# Patient Record
Sex: FEMALE | Race: WHITE | NOT HISPANIC OR LATINO | Employment: OTHER | URBAN - METROPOLITAN AREA
[De-identification: names, ages, dates, MRNs, and addresses within clinical notes are randomized per-mention and may not be internally consistent; named-entity substitution may affect disease eponyms.]

---

## 2018-02-28 ENCOUNTER — CONVERSION ENCOUNTER (OUTPATIENT)
Dept: GENERAL RADIOLOGY | Facility: HOSPITAL | Age: 78
End: 2018-02-28

## 2018-03-05 ENCOUNTER — OFFICE VISIT CONVERTED (OUTPATIENT)
Dept: PODIATRY | Facility: CLINIC | Age: 78
End: 2018-03-05
Attending: PODIATRIST

## 2020-02-19 ENCOUNTER — HOSPITAL ENCOUNTER (OUTPATIENT)
Dept: OTHER | Facility: HOSPITAL | Age: 80
Discharge: HOME | End: 2020-02-19
Attending: INTERNAL MEDICINE

## 2020-03-05 ENCOUNTER — HOSPITAL ENCOUNTER (OUTPATIENT)
Dept: OTHER | Facility: HOSPITAL | Age: 80
Discharge: HOME | End: 2020-03-05
Attending: INTERNAL MEDICINE

## 2020-03-05 LAB
ALBUMIN SERPL-MCNC: 3.8 G/DL (ref 3.5–5)
ALBUMIN/GLOB SERPL: 1.2 {RATIO} (ref 1.4–2.6)
ALP SERPL-CCNC: 82 U/L (ref 43–160)
ALT SERPL-CCNC: 20 U/L (ref 10–40)
ANION GAP SERPL CALC-SCNC: 17 MMOL/L (ref 8–19)
AST SERPL-CCNC: 14 U/L (ref 15–50)
BASOPHILS # BLD AUTO: 0.03 10*3/UL (ref 0–0.2)
BASOPHILS NFR BLD AUTO: 0.2 % (ref 0–3)
BILIRUB SERPL-MCNC: 0.17 MG/DL (ref 0.2–1.3)
BUN SERPL-MCNC: 12 MG/DL (ref 5–25)
BUN/CREAT SERPL: 19 {RATIO} (ref 6–20)
CALCIUM SERPL-MCNC: 9.5 MG/DL (ref 8.7–10.4)
CHLORIDE SERPL-SCNC: 101 MMOL/L (ref 99–111)
CONV ABS IMM GRAN: 0.08 10*3/UL (ref 0–0.2)
CONV CO2: 27 MMOL/L (ref 22–32)
CONV IMMATURE GRAN: 0.6 % (ref 0–1.8)
CONV TOTAL PROTEIN: 7 G/DL (ref 6.3–8.2)
CREAT UR-MCNC: 0.63 MG/DL (ref 0.5–0.9)
CRP SERPL HS-MCNC: 1.89 MG/DL (ref 0–0.5)
DEPRECATED RDW RBC AUTO: 46.4 FL (ref 36.4–46.3)
EOSINOPHIL # BLD AUTO: 0.01 10*3/UL (ref 0–0.7)
EOSINOPHIL # BLD AUTO: 0.1 % (ref 0–7)
ERYTHROCYTE [DISTWIDTH] IN BLOOD BY AUTOMATED COUNT: 13.3 % (ref 11.7–14.4)
ERYTHROCYTE [SEDIMENTATION RATE] IN BLOOD: 48 MM/H (ref 0–30)
GFR SERPLBLD BASED ON 1.73 SQ M-ARVRAT: >60 ML/MIN/{1.73_M2}
GLOBULIN UR ELPH-MCNC: 3.2 G/DL (ref 2–3.5)
GLUCOSE SERPL-MCNC: 125 MG/DL (ref 65–99)
HCT VFR BLD AUTO: 39.1 % (ref 37–47)
HGB BLD-MCNC: 11.8 G/DL (ref 12–16)
LYMPHOCYTES # BLD AUTO: 0.95 10*3/UL (ref 1–5)
LYMPHOCYTES NFR BLD AUTO: 7.3 % (ref 20–45)
MCH RBC QN AUTO: 28.9 PG (ref 27–31)
MCHC RBC AUTO-ENTMCNC: 30.2 G/DL (ref 33–37)
MCV RBC AUTO: 95.8 FL (ref 81–99)
MONOCYTES # BLD AUTO: 0.65 10*3/UL (ref 0.2–1.2)
MONOCYTES NFR BLD AUTO: 5 % (ref 3–10)
NEUTROPHILS # BLD AUTO: 11.38 10*3/UL (ref 2–8)
NEUTROPHILS NFR BLD AUTO: 86.8 % (ref 30–85)
NRBC CBCN: 0 % (ref 0–0.7)
OSMOLALITY SERPL CALC.SUM OF ELEC: 293 MOSM/KG (ref 273–304)
PLATELET # BLD AUTO: 335 10*3/UL (ref 130–400)
PMV BLD AUTO: 9.6 FL (ref 9.4–12.3)
POTASSIUM SERPL-SCNC: 3.7 MMOL/L (ref 3.5–5.3)
RBC # BLD AUTO: 4.08 10*6/UL (ref 4.2–5.4)
SODIUM SERPL-SCNC: 141 MMOL/L (ref 135–147)
WBC # BLD AUTO: 13.1 10*3/UL (ref 4.8–10.8)

## 2020-03-06 LAB — B BURGDOR IGG+IGM SER-ACNC: <0.91 ISR (ref 0–0.9)

## 2020-03-07 LAB
ASO AB SERPL-ACNC: 55 [IU]/ML (ref 0–200)
CCP IGA+IGG SERPL IA-ACNC: 9 UNITS (ref 0–19)
CONV RHEUMATOID FACTOR IGM: <10 [IU]/ML (ref 0–14)
CRP SERPL-MCNC: 20.8 MG/L (ref 0–5)
DSDNA AB SER-ACNC: NEGATIVE [IU]/ML
ENA AB SER IA-ACNC: NEGATIVE {RATIO}
URATE SERPL-MCNC: 4.1 MG/DL (ref 2.5–7.5)

## 2020-09-18 ENCOUNTER — HOSPITAL ENCOUNTER (OUTPATIENT)
Dept: OTHER | Facility: HOSPITAL | Age: 80
Discharge: HOME | End: 2020-09-18
Attending: INTERNAL MEDICINE

## 2021-01-13 ENCOUNTER — HOSPITAL ENCOUNTER (OUTPATIENT)
Dept: OTHER | Facility: HOSPITAL | Age: 81
Discharge: HOME | End: 2021-01-13
Attending: INTERNAL MEDICINE

## 2021-02-08 ENCOUNTER — HOSPITAL ENCOUNTER (OUTPATIENT)
Dept: VACCINE CLINIC | Facility: HOSPITAL | Age: 81
Discharge: HOME | End: 2021-02-08
Attending: INTERNAL MEDICINE

## 2021-04-06 ENCOUNTER — HOSPITAL ENCOUNTER (OUTPATIENT)
Dept: OTHER | Facility: HOSPITAL | Age: 81
Discharge: HOME | End: 2021-04-06
Attending: INTERNAL MEDICINE

## 2021-04-06 LAB
ALBUMIN SERPL-MCNC: 3.6 G/DL (ref 3.5–5)
ALBUMIN/GLOB SERPL: 1.1 {RATIO} (ref 1.4–2.6)
ALP SERPL-CCNC: 74 U/L (ref 43–160)
ALT SERPL-CCNC: 12 U/L (ref 10–40)
ANION GAP SERPL CALC-SCNC: 16 MMOL/L (ref 8–19)
AST SERPL-CCNC: 14 U/L (ref 15–50)
BASOPHILS # BLD AUTO: 0.04 10*3/UL (ref 0–0.2)
BASOPHILS NFR BLD AUTO: 0.6 % (ref 0–3)
BILIRUB SERPL-MCNC: 0.22 MG/DL (ref 0.2–1.3)
BUN SERPL-MCNC: 12 MG/DL (ref 5–25)
BUN/CREAT SERPL: 18 {RATIO} (ref 6–20)
CALCIUM SERPL-MCNC: 9.4 MG/DL (ref 8.7–10.4)
CHLORIDE SERPL-SCNC: 103 MMOL/L (ref 99–111)
CONV ABS IMM GRAN: 0.03 10*3/UL (ref 0–0.2)
CONV CO2: 24 MMOL/L (ref 22–32)
CONV IMMATURE GRAN: 0.4 % (ref 0–1.8)
CONV RHEUMATOID FACTOR IGM: <10 [IU]/ML (ref 0–14)
CONV TOTAL PROTEIN: 6.8 G/DL (ref 6.3–8.2)
CREAT UR-MCNC: 0.68 MG/DL (ref 0.5–0.9)
CRP SERPL-MCNC: 51.6 MG/L (ref 0–5)
DEPRECATED RDW RBC AUTO: 41.9 FL (ref 36.4–46.3)
EOSINOPHIL # BLD AUTO: 0.09 10*3/UL (ref 0–0.7)
EOSINOPHIL # BLD AUTO: 1.3 % (ref 0–7)
ERYTHROCYTE [DISTWIDTH] IN BLOOD BY AUTOMATED COUNT: 11.9 % (ref 11.7–14.4)
ERYTHROCYTE [SEDIMENTATION RATE] IN BLOOD: 62 MM/H (ref 0–30)
GFR SERPLBLD BASED ON 1.73 SQ M-ARVRAT: >60 ML/MIN/{1.73_M2}
GLOBULIN UR ELPH-MCNC: 3.2 G/DL (ref 2–3.5)
GLUCOSE SERPL-MCNC: 109 MG/DL (ref 65–99)
HCT VFR BLD AUTO: 36.4 % (ref 37–47)
HGB BLD-MCNC: 11.7 G/DL (ref 12–16)
LYMPHOCYTES # BLD AUTO: 1.28 10*3/UL (ref 1–5)
LYMPHOCYTES NFR BLD AUTO: 18.1 % (ref 20–45)
MCH RBC QN AUTO: 30.5 PG (ref 27–31)
MCHC RBC AUTO-ENTMCNC: 32.1 G/DL (ref 33–37)
MCV RBC AUTO: 94.8 FL (ref 81–99)
MONOCYTES # BLD AUTO: 0.74 10*3/UL (ref 0.2–1.2)
MONOCYTES NFR BLD AUTO: 10.5 % (ref 3–10)
NEUTROPHILS # BLD AUTO: 4.9 10*3/UL (ref 2–8)
NEUTROPHILS NFR BLD AUTO: 69.1 % (ref 30–85)
NRBC CBCN: 0 % (ref 0–0.7)
OSMOLALITY SERPL CALC.SUM OF ELEC: 288 MOSM/KG (ref 273–304)
PLATELET # BLD AUTO: 351 10*3/UL (ref 130–400)
PMV BLD AUTO: 10.2 FL (ref 9.4–12.3)
POTASSIUM SERPL-SCNC: 3.9 MMOL/L (ref 3.5–5.3)
RBC # BLD AUTO: 3.84 10*6/UL (ref 4.2–5.4)
SODIUM SERPL-SCNC: 139 MMOL/L (ref 135–147)
WBC # BLD AUTO: 7.08 10*3/UL (ref 4.8–10.8)

## 2021-04-07 LAB
CH50 SERPL-ACNC: >60 U/ML
DSDNA AB SER-ACNC: <1 IU/ML (ref 0–9)
DSDNA AB SER-ACNC: NEGATIVE [IU]/ML
ENA AB SER IA-ACNC: NEGATIVE {RATIO}
ENA RNP AB SER-ACNC: <0.2 AI (ref 0–0.9)
SMOOTH MUSCLE F-ACTIN AB IGG: 4 UNITS (ref 0–19)

## 2021-04-08 LAB
CHROMATIN AB: <0.2 AI (ref 0–0.9)
ENA SS-B AB SER-ACNC: <0.2 AI (ref 0–0.9)
SJOGREN'S ANTI-SS-A: <0.2 AI (ref 0–0.9)

## 2021-04-09 LAB
C3 SERPL-MCNC: 163 MG/DL (ref 88–201)
C4 SERPL-MCNC: 31 MG/DL (ref 10–40)
CCP IGA+IGG SERPL IA-ACNC: 3 UNITS (ref 0–19)
CONV ANA IGG BY ELISA: NORMAL
RHEUMATOID FACT SERPL-ACNC: 11 IU/ML (ref 0–14)

## 2021-04-16 ENCOUNTER — OFFICE VISIT CONVERTED (OUTPATIENT)
Dept: ORTHOPEDIC SURGERY | Facility: CLINIC | Age: 81
End: 2021-04-16
Attending: ORTHOPAEDIC SURGERY

## 2021-04-16 ENCOUNTER — CONVERSION ENCOUNTER (OUTPATIENT)
Dept: ORTHOPEDIC SURGERY | Facility: CLINIC | Age: 81
End: 2021-04-16

## 2021-04-20 LAB — CHROMATIN AB: <0.2

## 2021-05-09 NOTE — H&P
History and Physical      Patient Name: Patricia Donahue   Patient ID: [id number]   Sex: Female   YOB: 1940    Primary Care Provider: Ladarius Crockett MD   Referring Provider: Ministerio Wells DPM    Visit Date: March 5, 2018    Provider: Ministerio Wells DPM   Location: [facility]   Location Address: [address] [place] [address]  [id number]   Location Phone: [phone]          Chief Complaint  · Right Foot Pain  · Left Foot Pain  · Fungal Toenails      History Of Present Illness  Patricia Donahue presents to the office today for evaluation and treatment of      New, Established, New Problem:  New  Location:  Toenails  Duration:  Greater than five years  Onset:  Gradual  Nature:  sore with palpation.  Stable, worsening, improving:   Worsening  Aggravating factors:  Pain with shoe gear and ambulation.  Previous Treatment:  Use OTC tea tree oil       Past Medical History  High blood pressure; High cholesterol         Past Surgical History  *Denies any surgical procedures; *No Past Surgical History         Medication List  docusate sodium 100 mg oral capsule; esomeprazole magnesium 40 mg oral capsule,delayed release(DR/EC); nifedipine 90 mg oral tablet extended release; ranitidine HCl 300 mg oral capsule; Synthroid 50 mcg oral tablet; Yuvafem 10 mcg vaginal tablet         Allergy List  NO KNOWN DRUG ALLERGIES         Family Medical History  *No Known Family History         Social History  Alcohol (Never); Tobacco (Never)         Review of Systems  · Constitutional  o Admits  o : good general health lately  o Denies  o : fever, chills, additional constitutional symptoms except as noted in the HPI  · Eyes  o Denies  o : double vision  · HENT  o Denies  o : vertigo, recent head injury  · Cardiovascular  o Denies  o : chest pain, irregular heart beats  · Respiratory  o Denies  o : shortness of breath  · Gastrointestinal  o Denies  o : nausea,  "vomiting  · Integument  o Denies  o : new skin lesions  · Neurologic  o Denies  o : altered mental status, tingling or numbness  · Musculoskeletal  o Denies  o : joint pain, joint swelling, limitation of motion      Vitals  Date Time BP Position Site L\R Cuff Size HR RR TEMP(F) WT  HT  BMI kg/m2 BSA m2 O2 Sat HC       03/05/2018 02:53 PM      78 - R   136lbs 0oz 5'  4\" 23.34 1.67 100 %           Physical Examination  · Constitutional  o Appearance  o : No acute distress and appears to be generally in good health  · Cardiovascular  o Peripheral Vascular System  o :   § Pedal Pulses  § : Pulses 2 + and symmetrical  § Extremities  § : No edema in lower extremities  · Musculoskeletal  o General  o :   § General Musculoskeletal  § : Lower extremity muscle and strength and range of motion is equal and symmetrical bilaterally. The knees are noted to be normal in alignment. Ankle alignment and range of motion is notmral and foot structure is normal. Subtalar, metatarsal and metatarsal-phalangeal range of motion is noted to be within normal limits. The digits of both feet are in normal alignment. The gait is normal.  · Skin and Subcutaneous Tissue  o General Inspection  o : Skin is noted to have normal texture and turgot, with no excrescences noted. The toenails are noted to be without disese.   o Digits and Nails  o : Toenails, Right 1, 2, 3, 4, 5 and Left 1, 2, 3, 4, 5, are incurvated, elongated, thickened, yellowed, chalky, and painful to palpation.   · Neurologic  o Mental Status Examination  o :   § Orientation  § : Awake, alert, understands questions and responds appropriately and quickly  o Sensation  o :   § Light Touch  § : Epicritic sensations intact bilaterally.          Assessment  · Foot pain, left     729.5/M79.672  · Foot pain, right     729.5/M79.671  · Onychomycosis     110.1/B35.1      Plan  · Orders  o ACO-39: Current medications updated and reviewed () - 729.5/M79.672, 729.5/M79.671, 110.1/B35.1 - " 03/05/2018  · Medications  o Penlac 8 % topical solution   SIG: apply to the affected area(s) by topical route once daily preferably at bedtime or 8 hours before washing for 30 days   DISP: (1) 6.6 ml bottle with 11 refills  Prescribed on 03/05/2018     · Instructions  o Follow Up as Needed  o I have discussed the findings of this evaluation with the patient. The discussion included a complete verbal explanation of any changes in the examination results, diagnosis, and the current treatment plan. A schedule for future care needs was explained. If any questions should arise after returning home, I have encouraged the patient to feel free to contact Dr. Wells. The patient states understanding and agreement with this plan.   o Pt to monitor for problems and to contact Dr. Wells for follow-up should such signs occur. Patient states understanding and agreement with this plan.   · Disposition  o Call or Return if symptoms worsen or persist.            Electronically Signed by: Ministerio Wells DPM -Author on March 5, 2018 03:14:49 PM

## 2021-05-10 ENCOUNTER — HOSPITAL ENCOUNTER (OUTPATIENT)
Dept: OTHER | Facility: HOSPITAL | Age: 81
Discharge: HOME | End: 2021-05-10
Attending: OBSTETRICS & GYNECOLOGY

## 2021-05-10 LAB — APTT BLD: 23.1 S (ref 22.2–34.2)

## 2021-05-11 NOTE — H&P
History and Physical      Patient Name: Patricia Donahue   Patient ID: [id number]   Sex: Female   YOB: 1940    Primary Care Provider: Ladarius Crockett MD   Referring Provider: Ministerio Wells DPM    Visit Date: April 16, 2021    Provider: Julian Baca MD   Location: [facility]   Location Address: [address] [place] [address]  [id number]   Location Phone: [phone]          Chief Complaint  · right knee pain      History Of Present Illness  Patricia Donahue is a 80 year old /White female who presents today to [facility].      Patient presents today for an evaluation of right knee. Pain has been ongoing for 4-5 months with no known injury or trauma. She complains of swelling and localizes her pain along the medial joint line.       Past Medical History  High blood pressure; High cholesterol         Past Surgical History  *Denies any surgical procedures; *No Past Surgical History         Medication List  docusate sodium 100 mg oral capsule; esomeprazole magnesium 40 mg oral capsule,delayed release(DR/EC); nifedipine 90 mg oral tablet extended release; Penlac 8 % topical solution; ranitidine HCl 300 mg oral capsule; Synthroid 50 mcg oral tablet; Yuvafem 10 mcg vaginal tablet         Allergy List  NO KNOWN DRUG ALLERGIES       Allergies Reconciled  Family Medical History  *No Known Family History         Social History  Alcohol (Never); Tobacco (Never)         Review of Systems  · Constitutional  o Denies  o : fever, chills, weight loss  · Cardiovascular  o Denies  o : chest pain, shortness of breath  · Gastrointestinal  o Denies  o : liver disease, heartburn, nausea, blood in stools  · Genitourinary  o Denies  o : painful urination, blood in urine  · Integument  o Denies  o : rash, itching  · Neurologic  o Denies  o : headache, weakness, loss of consciousness  · Musculoskeletal  o Denies  o : painful, swollen joints  · Psychiatric  o Denies  o : drug/alcohol addiction,  "anxiety, depression      Vitals  Date Time BP Position Site L\R Cuff Size HR RR TEMP (F) WT  HT  BMI kg/m2 BSA m2 O2 Sat FR L/min FiO2 HC       04/16/2021 03:07 PM      90 - R   140lbs 0oz 5'  4\" 24.03 1.69 97 %            Physical Examination  · Constitutional  o Appearance  o : well developed, well-nourished, no obvious deformities present  · Head and Face  o Head  o :   § Inspection  § : normocephalic  o Face  o :   § Inspection  § : no facial lesions  · Eyes  o Conjunctivae  o : conjunctivae normal  o Sclerae  o : sclerae white  · Ears, Nose, Mouth and Throat  o Ears  o :   § External Ears  § : appearance within normal limits  § Hearing  § : intact  o Nose  o :   § External Nose  § : appearance normal  · Neck  o Inspection/Palpation  o : normal appearance  o Range of Motion  o : full range of motion  · Respiratory  o Respiratory Effort  o : breathing unlabored  o Inspection of Chest  o : normal appearance  o Auscultation of Lungs  o : no audible wheezing or rales  · Cardiovascular  o Heart  o : regular rate  · Gastrointestinal  o Abdominal Examination  o : soft and non-tender  · Skin and Subcutaneous Tissue  o General Inspection  o : intact, no rashes  · Psychiatric  o General  o : Alert and oriented x3  o Judgement and Insight  o : judgment and insight intact  o Mood and Affect  o : mood normal, affect appropriate  · Right Knee  o Inspection  o : Tender medial joint line. Appearance of the knee is normal except for some moderate effusion. Non-tender lateral joint line. Negative Lachman. Negative posterior sag. Stable to valgus/varus stress. Good strength in quadriceps, hamstrings, dorsiflexors, and plantar flexors. Pain with Apleys. Negative Lachman.   · Injection Note/Aspiration Note  o Site  o : right knee  o Procedure  o : Procedure: After educating the patient, patient gave consent for procedure. After using Chloraprep, the joint space was injected. The patient tolerated the procedure " well.  o Medication  o : 80 mg of DepoMedrol with 9cc of 1% Lidocaine          Assessment  · Mild osteoarthritis of right knee     715.16/M17.11  · Possible MMT (medial meniscus tear)     836.0/S83.249A  · Pain: Knee     719.46/M25.569      Plan  · Orders  o Depo-Medrol injection 80mg () - 719.46/M25.569 - 04/16/2021   Lot [id number] Exp 02 2022 [employer] Administered by Julian Baca MD  o Knee Intra-articular Injection without US [facility] ([id number]) - 719.46/M25.569 - 04/16/2021   Lot [id number] Exp 04 2024 [employer] Administered by Julian Baca MD  · Medications  o Medications have been Reconciled  o Transition of Care or Provider Policy  · Instructions  o Dr. Baca saw and examined the patient and agrees with plan.   o Reviewed the patient's Past Medical, Social, and Family history as well as the ROS at today's visit, no changes.  o Call or return if worsening symptoms.  o Follow Up PRN.  o This note was transcribed by Khushi Muniz. leanne  o Discussed treatment plans and diagnosis with the patient. Patient doesnt want to consider surgical intervention at this time. She received a right knee injection and tolerated this well.             Electronically Signed by: Khushi Muniz-, Other -Author on April 23, 2021 03:41:34 PM  Electronically Co-signed by: Julian Baca MD -Reviewer on April 25, 2021 07:09:53 PM

## 2021-05-14 VITALS — WEIGHT: 140 LBS | BODY MASS INDEX: 23.9 KG/M2 | HEART RATE: 90 BPM | HEIGHT: 64 IN | OXYGEN SATURATION: 97 %

## 2021-05-16 VITALS — WEIGHT: 136 LBS | HEART RATE: 78 BPM | BODY MASS INDEX: 23.22 KG/M2 | HEIGHT: 64 IN | OXYGEN SATURATION: 100 %

## 2021-09-12 ENCOUNTER — APPOINTMENT (OUTPATIENT)
Dept: GENERAL RADIOLOGY | Facility: HOSPITAL | Age: 81
End: 2021-09-12

## 2021-09-12 ENCOUNTER — HOSPITAL ENCOUNTER (INPATIENT)
Facility: HOSPITAL | Age: 81
LOS: 3 days | Discharge: HOME | End: 2021-09-16
Attending: EMERGENCY MEDICINE | Admitting: INTERNAL MEDICINE

## 2021-09-12 DIAGNOSIS — R26.2 UNABLE TO AMBULATE: ICD-10-CM

## 2021-09-12 DIAGNOSIS — R54 AGE-RELATED PHYSICAL DEBILITY: ICD-10-CM

## 2021-09-12 DIAGNOSIS — R53.1 GENERALIZED WEAKNESS: Primary | ICD-10-CM

## 2021-09-12 DIAGNOSIS — N39.0 COMPLICATED UTI (URINARY TRACT INFECTION): ICD-10-CM

## 2021-09-12 LAB
ALBUMIN SERPL-MCNC: 4 G/DL (ref 3.5–5.2)
ALBUMIN/GLOB SERPL: 1.3 G/DL
ALP SERPL-CCNC: 85 U/L (ref 39–117)
ALT SERPL W P-5'-P-CCNC: 14 U/L (ref 1–33)
ANION GAP SERPL CALCULATED.3IONS-SCNC: 11.7 MMOL/L (ref 5–15)
AST SERPL-CCNC: 14 U/L (ref 1–32)
BACTERIA UR QL AUTO: ABNORMAL /HPF
BASOPHILS # BLD AUTO: 0.03 10*3/MM3 (ref 0–0.2)
BASOPHILS NFR BLD AUTO: 0.3 % (ref 0–1.5)
BILIRUB SERPL-MCNC: 0.2 MG/DL (ref 0–1.2)
BILIRUB UR QL STRIP: NEGATIVE
BUN SERPL-MCNC: 11 MG/DL (ref 8–23)
BUN/CREAT SERPL: 15.1 (ref 7–25)
CALCIUM SPEC-SCNC: 9.5 MG/DL (ref 8.6–10.5)
CHLORIDE SERPL-SCNC: 99 MMOL/L (ref 98–107)
CK SERPL-CCNC: 59 U/L (ref 20–180)
CLARITY UR: ABNORMAL
CO2 SERPL-SCNC: 24.3 MMOL/L (ref 22–29)
COLOR UR: ABNORMAL
CREAT SERPL-MCNC: 0.73 MG/DL (ref 0.57–1)
DEPRECATED RDW RBC AUTO: 39.7 FL (ref 37–54)
EOSINOPHIL # BLD AUTO: 0.02 10*3/MM3 (ref 0–0.4)
EOSINOPHIL NFR BLD AUTO: 0.2 % (ref 0.3–6.2)
ERYTHROCYTE [DISTWIDTH] IN BLOOD BY AUTOMATED COUNT: 12.1 % (ref 12.3–15.4)
GFR SERPL CREATININE-BSD FRML MDRD: 77 ML/MIN/1.73
GLOBULIN UR ELPH-MCNC: 3 GM/DL
GLUCOSE SERPL-MCNC: 97 MG/DL (ref 65–99)
GLUCOSE UR STRIP-MCNC: NEGATIVE MG/DL
HCT VFR BLD AUTO: 35.9 % (ref 34–46.6)
HGB BLD-MCNC: 11.9 G/DL (ref 12–15.9)
HGB UR QL STRIP.AUTO: ABNORMAL
HOLD SPECIMEN: NORMAL
HOLD SPECIMEN: NORMAL
HYALINE CASTS UR QL AUTO: ABNORMAL /LPF
IMM GRANULOCYTES # BLD AUTO: 0.06 10*3/MM3 (ref 0–0.05)
IMM GRANULOCYTES NFR BLD AUTO: 0.6 % (ref 0–0.5)
KETONES UR QL STRIP: NEGATIVE
LEUKOCYTE ESTERASE UR QL STRIP.AUTO: ABNORMAL
LYMPHOCYTES # BLD AUTO: 0.99 10*3/MM3 (ref 0.7–3.1)
LYMPHOCYTES NFR BLD AUTO: 9.1 % (ref 19.6–45.3)
MAGNESIUM SERPL-MCNC: 2.1 MG/DL (ref 1.6–2.4)
MCH RBC QN AUTO: 30.2 PG (ref 26.6–33)
MCHC RBC AUTO-ENTMCNC: 33.1 G/DL (ref 31.5–35.7)
MCV RBC AUTO: 91.1 FL (ref 79–97)
MONOCYTES # BLD AUTO: 1.06 10*3/MM3 (ref 0.1–0.9)
MONOCYTES NFR BLD AUTO: 9.8 % (ref 5–12)
NEUTROPHILS NFR BLD AUTO: 8.67 10*3/MM3 (ref 1.7–7)
NEUTROPHILS NFR BLD AUTO: 80 % (ref 42.7–76)
NITRITE UR QL STRIP: POSITIVE
NRBC BLD AUTO-RTO: 0 /100 WBC (ref 0–0.2)
PH UR STRIP.AUTO: 6 [PH] (ref 5–8)
PLATELET # BLD AUTO: 296 10*3/MM3 (ref 140–450)
PMV BLD AUTO: 9.3 FL (ref 6–12)
POTASSIUM SERPL-SCNC: 4 MMOL/L (ref 3.5–5.2)
PROT SERPL-MCNC: 7 G/DL (ref 6–8.5)
PROT UR QL STRIP: ABNORMAL
RBC # BLD AUTO: 3.94 10*6/MM3 (ref 3.77–5.28)
RBC # UR: ABNORMAL /HPF
REF LAB TEST METHOD: ABNORMAL
SARS-COV-2 RNA RESP QL NAA+PROBE: NOT DETECTED
SODIUM SERPL-SCNC: 135 MMOL/L (ref 136–145)
SP GR UR STRIP: 1.01 (ref 1–1.03)
SQUAMOUS #/AREA URNS HPF: ABNORMAL /HPF
T4 FREE SERPL-MCNC: 1.06 NG/DL (ref 0.93–1.7)
TROPONIN T SERPL-MCNC: <0.01 NG/ML (ref 0–0.03)
TSH SERPL DL<=0.05 MIU/L-ACNC: 4.31 UIU/ML (ref 0.27–4.2)
UROBILINOGEN UR QL STRIP: ABNORMAL
WBC # BLD AUTO: 10.83 10*3/MM3 (ref 3.4–10.8)
WBC UR QL AUTO: ABNORMAL /HPF
WHOLE BLOOD HOLD SPECIMEN: NORMAL
WHOLE BLOOD HOLD SPECIMEN: NORMAL

## 2021-09-12 PROCEDURE — 85025 COMPLETE CBC W/AUTO DIFF WBC: CPT

## 2021-09-12 PROCEDURE — 84484 ASSAY OF TROPONIN QUANT: CPT

## 2021-09-12 PROCEDURE — 93005 ELECTROCARDIOGRAM TRACING: CPT | Performed by: EMERGENCY MEDICINE

## 2021-09-12 PROCEDURE — 84439 ASSAY OF FREE THYROXINE: CPT | Performed by: EMERGENCY MEDICINE

## 2021-09-12 PROCEDURE — 83735 ASSAY OF MAGNESIUM: CPT

## 2021-09-12 PROCEDURE — 25010000002 CEFTRIAXONE PER 250 MG: Performed by: EMERGENCY MEDICINE

## 2021-09-12 PROCEDURE — 82550 ASSAY OF CK (CPK): CPT | Performed by: EMERGENCY MEDICINE

## 2021-09-12 PROCEDURE — 87040 BLOOD CULTURE FOR BACTERIA: CPT | Performed by: EMERGENCY MEDICINE

## 2021-09-12 PROCEDURE — 99284 EMERGENCY DEPT VISIT MOD MDM: CPT

## 2021-09-12 PROCEDURE — 81001 URINALYSIS AUTO W/SCOPE: CPT | Performed by: EMERGENCY MEDICINE

## 2021-09-12 PROCEDURE — 71045 X-RAY EXAM CHEST 1 VIEW: CPT

## 2021-09-12 PROCEDURE — 93010 ELECTROCARDIOGRAM REPORT: CPT | Performed by: INTERNAL MEDICINE

## 2021-09-12 PROCEDURE — 84443 ASSAY THYROID STIM HORMONE: CPT | Performed by: EMERGENCY MEDICINE

## 2021-09-12 PROCEDURE — 80053 COMPREHEN METABOLIC PANEL: CPT

## 2021-09-12 PROCEDURE — P9612 CATHETERIZE FOR URINE SPEC: HCPCS

## 2021-09-12 PROCEDURE — 83605 ASSAY OF LACTIC ACID: CPT | Performed by: EMERGENCY MEDICINE

## 2021-09-12 PROCEDURE — G0378 HOSPITAL OBSERVATION PER HR: HCPCS

## 2021-09-12 PROCEDURE — U0005 INFEC AGEN DETEC AMPLI PROBE: HCPCS | Performed by: EMERGENCY MEDICINE

## 2021-09-12 PROCEDURE — U0003 INFECTIOUS AGENT DETECTION BY NUCLEIC ACID (DNA OR RNA); SEVERE ACUTE RESPIRATORY SYNDROME CORONAVIRUS 2 (SARS-COV-2) (CORONAVIRUS DISEASE [COVID-19]), AMPLIFIED PROBE TECHNIQUE, MAKING USE OF HIGH THROUGHPUT TECHNOLOGIES AS DESCRIBED BY CMS-2020-01-R: HCPCS | Performed by: EMERGENCY MEDICINE

## 2021-09-12 RX ORDER — SODIUM CHLORIDE 0.9 % (FLUSH) 0.9 %
10 SYRINGE (ML) INJECTION AS NEEDED
Status: DISCONTINUED | OUTPATIENT
Start: 2021-09-12 | End: 2021-09-16 | Stop reason: HOSPADM

## 2021-09-12 RX ADMIN — CEFTRIAXONE 1 G: 1 INJECTION, POWDER, FOR SOLUTION INTRAMUSCULAR; INTRAVENOUS at 23:50

## 2021-09-12 NOTE — ED TRIAGE NOTES
Joint pain x 3 days.  Recent arthritis dx.  She feels weak and can't get out of bed.  Ambulated for ems at scene    ,Patient was placed in face mask during first look triage.  Patient was wearing a face mask throughout encounter.  I wore personal protective equipment throughout the encounter.  Hand hygiene was performed before and after patient encounter.

## 2021-09-13 PROBLEM — I77.9 CAROTID ARTERY DISEASE: Status: ACTIVE | Noted: 2021-09-13

## 2021-09-13 PROBLEM — E03.9 HYPOTHYROIDISM: Status: ACTIVE | Noted: 2021-09-13

## 2021-09-13 PROBLEM — I10 HYPERTENSION: Status: ACTIVE | Noted: 2021-09-13

## 2021-09-13 PROBLEM — K21.9 GERD (GASTROESOPHAGEAL REFLUX DISEASE): Status: ACTIVE | Noted: 2021-09-13

## 2021-09-13 PROBLEM — Z86.73 HISTORY OF TIA (TRANSIENT ISCHEMIC ATTACK): Status: ACTIVE | Noted: 2021-09-13

## 2021-09-13 PROBLEM — N39.0 ACUTE UTI (URINARY TRACT INFECTION): Status: ACTIVE | Noted: 2021-09-13

## 2021-09-13 PROBLEM — M06.9 RHEUMATOID ARTHRITIS INVOLVING MULTIPLE SITES (HCC): Status: ACTIVE | Noted: 2021-09-13

## 2021-09-13 PROBLEM — E78.5 HLD (HYPERLIPIDEMIA): Status: ACTIVE | Noted: 2021-09-13

## 2021-09-13 LAB
ANION GAP SERPL CALCULATED.3IONS-SCNC: 9.9 MMOL/L (ref 5–15)
BUN SERPL-MCNC: 12 MG/DL (ref 8–23)
BUN/CREAT SERPL: 19.7 (ref 7–25)
CALCIUM SPEC-SCNC: 8.7 MG/DL (ref 8.6–10.5)
CHLORIDE SERPL-SCNC: 102 MMOL/L (ref 98–107)
CO2 SERPL-SCNC: 23.1 MMOL/L (ref 22–29)
CREAT SERPL-MCNC: 0.61 MG/DL (ref 0.57–1)
D-LACTATE SERPL-SCNC: 0.8 MMOL/L (ref 0.5–2)
DEPRECATED RDW RBC AUTO: 37.8 FL (ref 37–54)
ERYTHROCYTE [DISTWIDTH] IN BLOOD BY AUTOMATED COUNT: 12 % (ref 12.3–15.4)
GFR SERPL CREATININE-BSD FRML MDRD: 95 ML/MIN/1.73
GLUCOSE SERPL-MCNC: 97 MG/DL (ref 65–99)
HCT VFR BLD AUTO: 30.7 % (ref 34–46.6)
HGB BLD-MCNC: 10.4 G/DL (ref 12–15.9)
MCH RBC QN AUTO: 30 PG (ref 26.6–33)
MCHC RBC AUTO-ENTMCNC: 33.9 G/DL (ref 31.5–35.7)
MCV RBC AUTO: 88.5 FL (ref 79–97)
PLATELET # BLD AUTO: 278 10*3/MM3 (ref 140–450)
PMV BLD AUTO: 9.5 FL (ref 6–12)
POTASSIUM SERPL-SCNC: 3.8 MMOL/L (ref 3.5–5.2)
QT INTERVAL: 375 MS
RBC # BLD AUTO: 3.47 10*6/MM3 (ref 3.77–5.28)
SODIUM SERPL-SCNC: 135 MMOL/L (ref 136–145)
WBC # BLD AUTO: 9.27 10*3/MM3 (ref 3.4–10.8)

## 2021-09-13 PROCEDURE — 97162 PT EVAL MOD COMPLEX 30 MIN: CPT

## 2021-09-13 PROCEDURE — 97535 SELF CARE MNGMENT TRAINING: CPT

## 2021-09-13 PROCEDURE — 97166 OT EVAL MOD COMPLEX 45 MIN: CPT

## 2021-09-13 PROCEDURE — 36415 COLL VENOUS BLD VENIPUNCTURE: CPT | Performed by: NURSE PRACTITIONER

## 2021-09-13 PROCEDURE — 25010000002 CEFTRIAXONE PER 250 MG: Performed by: NURSE PRACTITIONER

## 2021-09-13 PROCEDURE — 25010000003 PREDNISONE 5 MG TABLET THERAPY PACK 21 EACH BOX: Performed by: NURSE PRACTITIONER

## 2021-09-13 PROCEDURE — 97110 THERAPEUTIC EXERCISES: CPT

## 2021-09-13 PROCEDURE — 85027 COMPLETE CBC AUTOMATED: CPT | Performed by: NURSE PRACTITIONER

## 2021-09-13 PROCEDURE — 63710000001 PREDNISONE PER 1 MG: Performed by: STUDENT IN AN ORGANIZED HEALTH CARE EDUCATION/TRAINING PROGRAM

## 2021-09-13 PROCEDURE — 80048 BASIC METABOLIC PNL TOTAL CA: CPT | Performed by: NURSE PRACTITIONER

## 2021-09-13 PROCEDURE — 63710000001 DIPHENHYDRAMINE PER 50 MG: Performed by: NURSE PRACTITIONER

## 2021-09-13 RX ORDER — LOSARTAN POTASSIUM 100 MG/1
100 TABLET ORAL
Status: DISCONTINUED | OUTPATIENT
Start: 2021-09-13 | End: 2021-09-16 | Stop reason: HOSPADM

## 2021-09-13 RX ORDER — LEVOTHYROXINE SODIUM 88 UG/1
0.05 TABLET ORAL DAILY
COMMUNITY
End: 2021-09-16 | Stop reason: HOSPADM

## 2021-09-13 RX ORDER — DIPHENHYDRAMINE HCL 25 MG
25 CAPSULE ORAL NIGHTLY
COMMUNITY

## 2021-09-13 RX ORDER — ACETAMINOPHEN 650 MG/1
650 SUPPOSITORY RECTAL EVERY 4 HOURS PRN
Status: DISCONTINUED | OUTPATIENT
Start: 2021-09-13 | End: 2021-09-16 | Stop reason: HOSPADM

## 2021-09-13 RX ORDER — NIFEDIPINE 90 MG/1
90 TABLET, EXTENDED RELEASE ORAL DAILY
COMMUNITY

## 2021-09-13 RX ORDER — IBUPROFEN 400 MG/1
800 TABLET ORAL EVERY 12 HOURS PRN
Status: DISCONTINUED | OUTPATIENT
Start: 2021-09-13 | End: 2021-09-16 | Stop reason: HOSPADM

## 2021-09-13 RX ORDER — LEVOTHYROXINE SODIUM 0.1 MG/1
100 TABLET ORAL
Status: DISCONTINUED | OUTPATIENT
Start: 2021-09-13 | End: 2021-09-16 | Stop reason: HOSPADM

## 2021-09-13 RX ORDER — CALCIUM CARBONATE 200(500)MG
2 TABLET,CHEWABLE ORAL 2 TIMES DAILY PRN
Status: DISCONTINUED | OUTPATIENT
Start: 2021-09-13 | End: 2021-09-16 | Stop reason: HOSPADM

## 2021-09-13 RX ORDER — SODIUM CHLORIDE 0.9 % (FLUSH) 0.9 %
10 SYRINGE (ML) INJECTION EVERY 12 HOURS SCHEDULED
Status: DISCONTINUED | OUTPATIENT
Start: 2021-09-13 | End: 2021-09-16 | Stop reason: HOSPADM

## 2021-09-13 RX ORDER — SODIUM CHLORIDE 0.9 % (FLUSH) 0.9 %
10 SYRINGE (ML) INJECTION AS NEEDED
Status: DISCONTINUED | OUTPATIENT
Start: 2021-09-13 | End: 2021-09-16 | Stop reason: HOSPADM

## 2021-09-13 RX ORDER — DIPHENHYDRAMINE HCL 25 MG
25 CAPSULE ORAL NIGHTLY
Status: DISCONTINUED | OUTPATIENT
Start: 2021-09-13 | End: 2021-09-16 | Stop reason: HOSPADM

## 2021-09-13 RX ORDER — ASPIRIN 81 MG/1
81 TABLET, CHEWABLE ORAL DAILY
Status: DISCONTINUED | OUTPATIENT
Start: 2021-09-13 | End: 2021-09-16 | Stop reason: HOSPADM

## 2021-09-13 RX ORDER — ONDANSETRON 2 MG/ML
4 INJECTION INTRAMUSCULAR; INTRAVENOUS EVERY 6 HOURS PRN
Status: DISCONTINUED | OUTPATIENT
Start: 2021-09-13 | End: 2021-09-16 | Stop reason: HOSPADM

## 2021-09-13 RX ORDER — ESOMEPRAZOLE MAGNESIUM 40 MG/1
40 CAPSULE, DELAYED RELEASE ORAL 2 TIMES DAILY
COMMUNITY

## 2021-09-13 RX ORDER — TELMISARTAN 80 MG/1
80 TABLET ORAL DAILY
COMMUNITY
End: 2022-10-05 | Stop reason: ALTCHOICE

## 2021-09-13 RX ORDER — IBUPROFEN 800 MG/1
800 TABLET ORAL 2 TIMES DAILY
COMMUNITY

## 2021-09-13 RX ORDER — PANTOPRAZOLE SODIUM 40 MG/1
40 TABLET, DELAYED RELEASE ORAL EVERY MORNING
Status: DISCONTINUED | OUTPATIENT
Start: 2021-09-13 | End: 2021-09-16 | Stop reason: HOSPADM

## 2021-09-13 RX ORDER — ACETAMINOPHEN 325 MG/1
650 TABLET ORAL EVERY 4 HOURS PRN
Status: DISCONTINUED | OUTPATIENT
Start: 2021-09-13 | End: 2021-09-16 | Stop reason: HOSPADM

## 2021-09-13 RX ORDER — ACETAMINOPHEN 160 MG/5ML
650 SOLUTION ORAL EVERY 4 HOURS PRN
Status: DISCONTINUED | OUTPATIENT
Start: 2021-09-13 | End: 2021-09-16 | Stop reason: HOSPADM

## 2021-09-13 RX ORDER — ONDANSETRON 4 MG/1
4 TABLET, FILM COATED ORAL EVERY 6 HOURS PRN
Status: DISCONTINUED | OUTPATIENT
Start: 2021-09-13 | End: 2021-09-16 | Stop reason: HOSPADM

## 2021-09-13 RX ADMIN — PREDNISONE 30 MG: 20 TABLET ORAL at 18:05

## 2021-09-13 RX ADMIN — SODIUM CHLORIDE, PRESERVATIVE FREE 10 ML: 5 INJECTION INTRAVENOUS at 08:39

## 2021-09-13 RX ADMIN — SODIUM CHLORIDE, PRESERVATIVE FREE 10 ML: 5 INJECTION INTRAVENOUS at 02:30

## 2021-09-13 RX ADMIN — ACETAMINOPHEN 650 MG: 325 TABLET, FILM COATED ORAL at 08:43

## 2021-09-13 RX ADMIN — LEVOTHYROXINE SODIUM 100 MCG: 0.1 TABLET ORAL at 06:58

## 2021-09-13 RX ADMIN — PANTOPRAZOLE SODIUM 40 MG: 40 TABLET, DELAYED RELEASE ORAL at 06:14

## 2021-09-13 RX ADMIN — ACETAMINOPHEN 650 MG: 325 TABLET, FILM COATED ORAL at 16:37

## 2021-09-13 RX ADMIN — IBUPROFEN 800 MG: 400 TABLET, FILM COATED ORAL at 22:37

## 2021-09-13 RX ADMIN — SODIUM CHLORIDE, PRESERVATIVE FREE 10 ML: 5 INJECTION INTRAVENOUS at 22:30

## 2021-09-13 RX ADMIN — PREDNISONE 5 MG: 5 TABLET ORAL at 12:05

## 2021-09-13 RX ADMIN — LOSARTAN POTASSIUM 100 MG: 100 TABLET, FILM COATED ORAL at 08:38

## 2021-09-13 RX ADMIN — IBUPROFEN 800 MG: 400 TABLET, FILM COATED ORAL at 10:22

## 2021-09-13 RX ADMIN — ACETAMINOPHEN 650 MG: 325 TABLET, FILM COATED ORAL at 04:54

## 2021-09-13 RX ADMIN — DIPHENHYDRAMINE HYDROCHLORIDE 25 MG: 25 CAPSULE ORAL at 22:30

## 2021-09-13 RX ADMIN — ASPIRIN 81 MG: 81 TABLET, CHEWABLE ORAL at 08:38

## 2021-09-13 RX ADMIN — PREDNISONE 10 MG: 5 TABLET ORAL at 09:33

## 2021-09-13 RX ADMIN — NIFEDIPINE 90 MG: 60 TABLET, FILM COATED, EXTENDED RELEASE ORAL at 08:43

## 2021-09-13 RX ADMIN — CEFTRIAXONE 1 G: 1 INJECTION, POWDER, FOR SOLUTION INTRAMUSCULAR; INTRAVENOUS at 22:30

## 2021-09-13 NOTE — H&P
Patient Name:  Patricia Donahue  YOB: 1940  MRN:  [id number]  Admit Date:  9/12/2021  Patient Care Team:  Provider, No Known as PCP - General      Subjective   History Present Illness     Chief Complaint   Patient presents with   • Pain   • Weakness - Generalized       Ms. Donahue is a 80 y.o. former smoker with a history of rheumatoid arthritis, carotid artery disease, hypertension, hypothyroidism, hyperlipidemia, TIA, and GERD that presents to [facility] complaining of generalized weakness.  She reports generalized weakness and body aches for the past 3 days.  She reports pain in her joints that is described as constant, severe, and aching in nature.  She states movement exacerbates the pain and taking Ibuprofen 800 mg twice a day has been somewhat alleviating.  She states she has experienced this pain in the past and she was told it was due to an autoimmune response to illness. She reports dysuria,hematuria, suprapubic tenderness, urinary frequency, and decreased urine.  She states she has been taking Macrobid for UTI since last Friday.  She denies fever, chest pain, and shortness of breath.  A urinalysis performed in the ED revealed 4+ bacteria, TNTC WBCs, RBCs, 3+ leukocytes, nitrites, and trace blood.  She received Rocephin in the ED.      History of Present Illness  Review of Systems   Constitutional: Positive for chills. Negative for fever.   HENT: Negative for congestion and sore throat.    Eyes: Negative for photophobia and visual disturbance.   Respiratory: Negative for cough and shortness of breath.    Cardiovascular: Negative for chest pain, palpitations and leg swelling.   Gastrointestinal: Positive for constipation. Negative for abdominal pain, diarrhea, nausea and vomiting.   Endocrine: Negative for polydipsia, polyphagia and polyuria.   Genitourinary: Positive for decreased urine volume, dysuria, flank pain, frequency, hematuria and urgency.   Musculoskeletal:  Positive for arthralgias, gait problem, myalgias and neck pain.   Skin: Negative for rash and wound.   Neurological: Positive for weakness (generalized). Negative for dizziness, speech difficulty, light-headedness, numbness and headaches.        Personal History     History reviewed. No pertinent past medical history.  History reviewed. No pertinent surgical history.  History reviewed. No pertinent family history.  Social History     Tobacco Use   • Smoking status: Former Smoker   Substance Use Topics   • Alcohol use: Not Currently   • Drug use: Never     Medications Prior to Admission   Medication Sig Dispense Refill Last Dose   • Aspirin 81 MG capsule Take 81 mg by mouth Daily.   9/12/2021 at Unknown time   • calcium citrate-vitamin d (CALCITRATE) 315-250 MG-UNIT tablet tablet Take 1 tablet by mouth 2 (two) times a day.   9/12/2021 at Unknown time   • diphenhydrAMINE (BENADRYL) 25 mg capsule Take 25 mg by mouth Every Night.   9/12/2021 at Unknown time   • esomeprazole (nexIUM) 40 MG capsule Take 40 mg by mouth 2 (Two) Times a Day.   9/12/2021 at Unknown time   • ibuprofen (ADVIL,MOTRIN) 800 MG tablet Take 800 mg by mouth 2 (Two) Times a Day.   9/12/2021 at Unknown time   • levothyroxine (SYNTHROID, LEVOTHROID) 88 MCG tablet Take 0.05 mcg by mouth Daily.   9/12/2021 at Unknown time   • NIFEdipine XL (PROCARDIA XL) 90 MG 24 hr tablet Take 90 mg by mouth Daily.   9/12/2021 at Unknown time   • telmisartan (MICARDIS) 80 MG tablet Take 80 mg by mouth Daily.   9/12/2021 at Unknown time     Allergies:  No Known Allergies    Objective    Objective     Vital Signs  Temp:  [98 °F (36.7 °C)-98.4 °F (36.9 °C)] 98.4 °F (36.9 °C)  Heart Rate:  [84-92] 92  Resp:  [14] 14  BP: (141-155)/(66-82) 155/78  SpO2:  [93 %-96 %] 93 %  on   ;   Device (Oxygen Therapy): room air  Body mass index is 24.03 kg/m².    Physical Exam  Vitals and nursing note reviewed.   Constitutional:       Appearance: Normal appearance.   HENT:      Head:  Normocephalic and atraumatic.      Nose: Nose normal.      Mouth/Throat:      Mouth: Mucous membranes are dry.      Pharynx: Oropharynx is clear.   Eyes:      Extraocular Movements: Extraocular movements intact.      Conjunctiva/sclera: Conjunctivae normal.   Cardiovascular:      Rate and Rhythm: Normal rate and regular rhythm.      Pulses: Normal pulses.      Heart sounds: Normal heart sounds.   Pulmonary:      Effort: Pulmonary effort is normal.      Breath sounds: Normal breath sounds.   Abdominal:      General: Bowel sounds are normal. There is no distension.      Palpations: Abdomen is soft. There is no mass.      Tenderness: There is no abdominal tenderness. There is no guarding or rebound.      Hernia: No hernia is present.   Musculoskeletal:         General: Tenderness (generalized) present. No swelling. Normal range of motion.      Cervical back: Normal range of motion. Tenderness present.   Lymphadenopathy:      Cervical: No cervical adenopathy.   Skin:     General: Skin is warm and dry.   Neurological:      General: No focal deficit present.      Mental Status: She is alert and oriented to person, place, and time.   Psychiatric:         Mood and Affect: Mood normal.         Behavior: Behavior normal.         Results Review:  I reviewed the patient's new clinical results.  I reviewed the patient's new imaging results and agree with the interpretation.  I reviewed the patient's other test results and agree with the interpretation  I personally viewed and interpreted the patient's EKG/Telemetry data  Discussed with ED provider.    Lab Results (last 24 hours)     Procedure Component Value Units Date/Time    CBC & Differential [[id number]]  (Abnormal) Collected: 09/12/21 1901    Specimen: Blood Updated: 09/12/21 1917    Narrative:      The following orders were created for panel order CBC & Differential.  Procedure                               Abnormality         Status                     ---------                                -----------         ------                     CBC Auto Differential[[id number]]        Abnormal            Final result                 Please view results for these tests on the individual orders.    Comprehensive Metabolic Panel [[id number]]  (Abnormal) Collected: 09/12/21 1901    Specimen: Blood Updated: 09/12/21 1936     Glucose 97 mg/dL      BUN 11 mg/dL      Creatinine 0.73 mg/dL      Sodium 135 mmol/L      Potassium 4.0 mmol/L      Chloride 99 mmol/L      CO2 24.3 mmol/L      Calcium 9.5 mg/dL      Total Protein 7.0 g/dL      Albumin 4.00 g/dL      ALT (SGPT) 14 U/L      AST (SGOT) 14 U/L      Alkaline Phosphatase 85 U/L      Total Bilirubin 0.2 mg/dL      eGFR Non African Amer 77 mL/min/1.73      Globulin 3.0 gm/dL      A/G Ratio 1.3 g/dL      BUN/Creatinine Ratio 15.1     Anion Gap 11.7 mmol/L     Narrative:      GFR Normal >60  Chronic Kidney Disease <60  Kidney Failure <15      Troponin [[id number]]  (Normal) Collected: 09/12/21 1901    Specimen: Blood Updated: 09/12/21 1936     Troponin T <0.010 ng/mL     Narrative:      Troponin T Reference Range:  <= 0.03 ng/mL-   Negative for AMI  >0.03 ng/mL-     Abnormal for myocardial necrosis.  Clinicians would have to utilize clinical acumen, EKG, Troponin and serial changes to determine if it is an Acute Myocardial Infarction or myocardial injury due to an underlying chronic condition.       Results may be falsely decreased if patient taking Biotin.      Magnesium [[id number]]  (Normal) Collected: 09/12/21 1901    Specimen: Blood Updated: 09/12/21 1936     Magnesium 2.1 mg/dL     CBC Auto Differential [[id number]]  (Abnormal) Collected: 09/12/21 1901    Specimen: Blood Updated: 09/12/21 1917     WBC 10.83 10*3/mm3      RBC 3.94 10*6/mm3      Hemoglobin 11.9 g/dL      Hematocrit 35.9 %      MCV 91.1 fL      MCH 30.2 pg      MCHC 33.1 g/dL      RDW 12.1 %      RDW-SD 39.7 fl      MPV 9.3 fL      Platelets 296 10*3/mm3      Neutrophil % 80.0 %       Lymphocyte % 9.1 %      Monocyte % 9.8 %      Eosinophil % 0.2 %      Basophil % 0.3 %      Immature Grans % 0.6 %      Neutrophils, Absolute 8.67 10*3/mm3      Lymphocytes, Absolute 0.99 10*3/mm3      Monocytes, Absolute 1.06 10*3/mm3      Eosinophils, Absolute 0.02 10*3/mm3      Basophils, Absolute 0.03 10*3/mm3      Immature Grans, Absolute 0.06 10*3/mm3      nRBC 0.0 /100 WBC     CK [[id number]]  (Normal) Collected: 09/12/21 1901    Specimen: Blood Updated: 09/12/21 2140     Creatine Kinase 59 U/L     TSH [[id number]]  (Abnormal) Collected: 09/12/21 1901    Specimen: Blood Updated: 09/12/21 2132     TSH 4.310 uIU/mL     T4, Free [[id number]]  (Normal) Collected: 09/12/21 1901    Specimen: Blood Updated: 09/12/21 2132     Free T4 1.06 ng/dL     Narrative:      Results may be falsely increased if patient taking Biotin.      COVID-19,[facility] IN-HOUSE CEPHEID/TYLER NP SWAB IN TRANSPORT MEDIA 8-12 HR TAT - Swab, Nasopharynx [[id number]]  (Normal) Collected: 09/12/21 2150    Specimen: Swab from Nasopharynx Updated: 09/12/21 2249     COVID19 Not Detected    Narrative:      Fact sheet for providers: https://www.fda.gov/media/691734/download     Fact sheet for patients: https://www.fda.gov/media/715965/download    Urinalysis With Microscopic If Indicated (No Culture) - Urine, Catheter [[id number]]  (Abnormal) Collected: 09/12/21 2210    Specimen: Urine, Catheter Updated: 09/12/21 2229     Color, UA Dark Yellow     Appearance, UA Cloudy     pH, UA 6.0     Specific Gravity, UA 1.010     Glucose, UA Negative     Ketones, UA Negative     Bilirubin, UA Negative     Blood, UA Trace     Protein, UA Trace     Leuk Esterase, UA Large (3+)     Nitrite, UA Positive     Urobilinogen, UA 0.2 E.U./dL    Urinalysis, Microscopic Only - Urine, Catheter [[id number]]  (Abnormal) Collected: 09/12/21 2210    Specimen: Urine, Catheter Updated: 09/12/21 2229     RBC, UA 3-5 /HPF      WBC, UA Too Numerous to Count /HPF      Bacteria, UA 4+ /HPF       Squamous Epithelial Cells, UA 0-2 /HPF      Hyaline Casts, UA 0-2 /LPF      Methodology Automated Microscopy    Blood Culture - Blood, Arm, Left [[id number]] Collected: 09/12/21 2334    Specimen: Blood from Arm, Left Updated: 09/12/21 2345    Lactic Acid, Plasma [[id number]]  (Normal) Collected: 09/12/21 2334    Specimen: Blood Updated: 09/13/21 0001     Lactate 0.8 mmol/L     Blood Culture - Blood, Arm, Right [[id number]] Collected: 09/12/21 2341    Specimen: Blood from Arm, Right Updated: 09/12/21 2345          Imaging Results (Last 24 Hours)     Procedure Component Value Units Date/Time    XR Chest 1 View [[id number]] Collected: 09/12/21 1807     Updated: 09/12/21 1909    Narrative:      PORTABLE CHEST     HISTORY:  Weakness, dizziness.     COMPARISON: None.     A single view of the chest demonstrates the heart to be within normal  limits in size. There is no evidence of consolidation, effusion or  congestive failure. Minimal linear atelectasis/scarring is noted  involving the lung bases bilaterally.      This report was finalized on 9/12/2021 7:06 PM by Dr. Esdras Sellers M.D.                 ECG 12 Lead   Preliminary Result   HEART RATE= 84  bpm   RR Interval= 712  ms   WV Interval= 141  ms   P Horizontal Axis= -8  deg   P Front Axis= 66  deg   QRSD Interval= 88  ms   QT Interval= 375  ms   QRS Axis= 68  deg   T Wave Axis= 37  deg   - BORDERLINE ECG -   Sinus rhythm   Probable left atrial enlargement   Electronically Signed By:    Date and Time of Study: 2021-09-12 21:13:46           Assessment/Plan     Active Hospital Problems    Diagnosis  POA   • **Acute UTI (urinary tract infection) [N39.0]  Unknown   • Hypertension [I10]  Unknown   • Hypothyroidism [E03.9]  Unknown   • Carotid artery disease (CMS/HCC) [I77.9]  Unknown   • History of TIA (transient ischemic attack) [Z86.73]  Not Applicable   • HLD (hyperlipidemia) [E78.5]  Unknown   • GERD (gastroesophageal reflux disease) [K21.9]  Unknown   • Rheumatoid  arthritis involving multiple sites (CMS/[facility]) [M06.9]  Unknown   • Generalized weakness [R53.1]  Yes       Acute UTI  -Admit to a med/surg unit for monitoring  -She has been afebrile, WBC ok  -Continue Rocephin 1 gm daily  -Urine and blood cultures pending    Rheumatoid Arthritis  -She appears uncomfortable on exam  -She states she has taken Prednisone in the past with relief. Will order a Prednisone taper  -PT/OT consults  -Monitor    Hypertension  -Blood pressures stable.  Continue home regimen  -Monitor    Hypothyroidism  -S/p partial thyroidectomy, 2002  -TSH is elevated. Will increase dose of Levothyroxine    Hyperlipidemia/Carotid Artery Disease/TIA  -S/p left carotid endarterectomy 4/20/2018  -Continue home medications    GERD  -Continue home PPI    -I discussed the patients findings and my recommendations with patient.    VTE Prophylaxis - SCDs.  Code Status - Full code.       ARMINDA Shukla  [facility]  09/13/21  04:37 EDT

## 2021-09-13 NOTE — ED NOTES
Nursing report ED to floor  Patricia Donahue  80 y.o.  female    HPI (triage note):   Chief Complaint   Patient presents with   • Pain   • Weakness - Generalized       Admitting doctor:   Eben Barnard MD    Admitting diagnosis:   The primary encounter diagnosis was Generalized weakness. Diagnoses of Unable to ambulate and Complicated UTI (urinary tract infection) were also pertinent to this visit.    Code status:   Current Code Status     Date Active Code Status Order ID Comments User Context       Not on file    Advance Care Planning Activity          Allergies:   Patient has no known allergies.    Weight:       09/12/21 2146   Weight: 63.5 kg (140 lb)       Most recent vitals:   Vitals:    09/13/21 0031 09/13/21 0045 09/13/21 0100 09/13/21 0101   BP: 147/76   141/71   Pulse:  84 88    Resp:       Temp:       TempSrc:       SpO2:  94% 93%    Weight:       Height:           Active LDAs/IV Access:   Lines, Drains & Airways    Active LDAs     Name:   Placement date:   Placement time:   Site:   Days:    Peripheral IV 09/12/21 2330 Left Antecubital   09/12/21 2330    Antecubital   less than 1                Labs (abnormal labs have a star):   Labs Reviewed   COMPREHENSIVE METABOLIC PANEL - Abnormal; Notable for the following components:       Result Value    Sodium 135 (*)     All other components within normal limits    Narrative:     GFR Normal >60  Chronic Kidney Disease <60  Kidney Failure <15     URINALYSIS W/ MICROSCOPIC IF INDICATED (NO CULTURE) - Abnormal; Notable for the following components:    Color, UA Dark Yellow (*)     Appearance, UA Cloudy (*)     Blood, UA Trace (*)     Protein, UA Trace (*)     Leuk Esterase, UA Large (3+) (*)     Nitrite, UA Positive (*)     All other components within normal limits   CBC WITH AUTO DIFFERENTIAL - Abnormal; Notable for the following components:    WBC 10.83 (*)     Hemoglobin 11.9 (*)     RDW 12.1 (*)     Neutrophil % 80.0 (*)     Lymphocyte % 9.1 (*)      Eosinophil % 0.2 (*)     Immature Grans % 0.6 (*)     Neutrophils, Absolute 8.67 (*)     Monocytes, Absolute 1.06 (*)     Immature Grans, Absolute 0.06 (*)     All other components within normal limits   TSH - Abnormal; Notable for the following components:    TSH 4.310 (*)     All other components within normal limits   URINALYSIS, MICROSCOPIC ONLY - Abnormal; Notable for the following components:    RBC, UA 3-5 (*)     WBC, UA Too Numerous to Count (*)     Bacteria, UA 4+ (*)     All other components within normal limits   COVID-19,[facility] IN-HOUSE CEPHEID/TYLER, NP SWAB IN TRANSPORT MEDIA 8-12 HR TAT - Normal    Narrative:     Fact sheet for providers: https://www.fda.gov/media/131756/download     Fact sheet for patients: https://www.fda.gov/media/811801/download   TROPONIN (IN-HOUSE) - Normal    Narrative:     Troponin T Reference Range:  <= 0.03 ng/mL-   Negative for AMI  >0.03 ng/mL-     Abnormal for myocardial necrosis.  Clinicians would have to utilize clinical acumen, EKG, Troponin and serial changes to determine if it is an Acute Myocardial Infarction or myocardial injury due to an underlying chronic condition.       Results may be falsely decreased if patient taking Biotin.     MAGNESIUM - Normal   CK - Normal   T4, FREE - Normal    Narrative:     Results may be falsely increased if patient taking Biotin.     LACTIC ACID, PLASMA - Normal   BLOOD CULTURE   BLOOD CULTURE   RAINBOW DRAW    Narrative:     The following orders were created for panel order [facility] Draw.  Procedure                               Abnormality         Status                     ---------                               -----------         ------                     Green Top (Gel)[[id number]]                                  Final result               Lavender Top[[id number]]                                     Final result               Gold Top - SST[[id number]]                                   Final result               Light Blue  Top[[id number]]                                   Final result                 Please view results for these tests on the individual orders.   POCT GLUCOSE FINGERSTICK   CBC AND DIFFERENTIAL    Narrative:     The following orders were created for panel order CBC & Differential.  Procedure                               Abnormality         Status                     ---------                               -----------         ------                     CBC Auto Differential[[id number]]        Abnormal            Final result                 Please view results for these tests on the individual orders.   GREEN TOP   LAVENDER TOP   GOLD TOP - SST   LIGHT BLUE TOP       EKG:   ECG 12 Lead   Preliminary Result   HEART RATE= 84  bpm   RR Interval= 712  ms   PA Interval= 141  ms   P Horizontal Axis= -8  deg   P Front Axis= 66  deg   QRSD Interval= 88  ms   QT Interval= 375  ms   QRS Axis= 68  deg   T Wave Axis= 37  deg   - BORDERLINE ECG -   Sinus rhythm   Probable left atrial enlargement   Electronically Signed By:    Date and Time of Study: 2021-09-12 21:13:46          Meds given in ED:   Medications   sodium chloride 0.9 % flush 10 mL (has no administration in time range)   cefTRIAXone (ROCEPHIN) 1 g in sodium chloride 0.9 % 100 mL IVPB-VTB (0 g Intravenous Stopped 9/13/21 0020)       Imaging results:  No radiology results for the last day    Ambulatory status:   - up with assist and walker only    Social issues:   Social History     Socioeconomic History   • Marital status:      Spouse name: Not on file   • Number of children: Not on file   • Years of education: Not on file   • Highest education level: Not on file   Tobacco Use   • Smoking status: Former Smoker   Substance and Sexual Activity   • Alcohol use: Not Currently   • Drug use: Never    Nursing report ED to floor     Fern Aguilar RN  09/13/21 0116

## 2021-09-13 NOTE — ED PROVIDER NOTES
EMERGENCY DEPARTMENT ENCOUNTER    Room Number:  15/15  Date of encounter:  9/12/2021  PCP: Provider, No Known  Historian: Patient      HPI:  Chief Complaint: Generalized weakness and body ache  A complete HPI/ROS/PMH/PSH/SH/FH are unobtainable due to: None    Context: Patricia Donahue is a 80 y.o. female who presents to the ED c/o generalized weakness and body aches.  Is been ongoing for 3 days.  States that she hurts in her joints hurts all over.  This is constant.  Nothing makes this better or worse.  She has tried ibuprofen and Norco without much relief.  She states that she has a history of similar but was never diagnosed with the underlying etiology of this.  She denies having any fever, cough, shortness of breath, chest pain.  No vomiting, abdominal pain, diarrhea.    Of note, patient states that she has had dysuria.  She was recently diagnosed with UTI and started on nitrofurantoin.      PAST MEDICAL HISTORY  Active Ambulatory Problems     Diagnosis Date Noted   • No Active Ambulatory Problems     Resolved Ambulatory Problems     Diagnosis Date Noted   • No Resolved Ambulatory Problems     No Additional Past Medical History         PAST SURGICAL HISTORY  History reviewed. No pertinent surgical history.      FAMILY HISTORY  History reviewed. No pertinent family history.      SOCIAL HISTORY  Social History     Socioeconomic History   • Marital status:      Spouse name: Not on file   • Number of children: Not on file   • Years of education: Not on file   • Highest education level: Not on file   Tobacco Use   • Smoking status: Former Smoker   Substance and Sexual Activity   • Alcohol use: Not Currently   • Drug use: Never         ALLERGIES  Patient has no known allergies.        REVIEW OF SYSTEMS  Review of Systems     All systems reviewed and negative except for those discussed in HPI.       PHYSICAL EXAM    I have reviewed the triage vital signs and nursing notes.    ED Triage Vitals [09/12/21 1726]   Temp  Heart Rate Resp BP SpO2   98 °F (36.7 °C) 85 14 141/82 96 %      Temp src Heart Rate Source Patient Position BP Location FiO2 (%)   Tympanic Monitor -- -- --       Physical Exam  GENERAL: not distressed  HENT: nares patent  EYES: no scleral icterus  CV: regular rhythm, regular rate  RESPIRATORY: normal effort, clear to auscultation bilaterally  ABDOMEN: soft, nontender  MUSCULOSKELETAL: no deformity  NEURO: alert, moves all extremities with 5/5 strength when examined individually however she does have difficulty pulling herself up in bed, follows commands  SKIN: warm, dry        LAB RESULTS  Recent Results (from the past 24 hour(s))   Comprehensive Metabolic Panel    Collection Time: 09/12/21  7:01 PM    Specimen: Blood   Result Value Ref Range    Glucose 97 65 - 99 mg/dL    BUN 11 8 - 23 mg/dL    Creatinine 0.73 0.57 - 1.00 mg/dL    Sodium 135 (L) 136 - 145 mmol/L    Potassium 4.0 3.5 - 5.2 mmol/L    Chloride 99 98 - 107 mmol/L    CO2 24.3 22.0 - 29.0 mmol/L    Calcium 9.5 8.6 - 10.5 mg/dL    Total Protein 7.0 6.0 - 8.5 g/dL    Albumin 4.00 3.50 - 5.20 g/dL    ALT (SGPT) 14 1 - 33 U/L    AST (SGOT) 14 1 - 32 U/L    Alkaline Phosphatase 85 39 - 117 U/L    Total Bilirubin 0.2 0.0 - 1.2 mg/dL    eGFR Non African Amer 77 >60 mL/min/1.73    Globulin 3.0 gm/dL    A/G Ratio 1.3 g/dL    BUN/Creatinine Ratio 15.1 7.0 - 25.0    Anion Gap 11.7 5.0 - 15.0 mmol/L   Troponin    Collection Time: 09/12/21  7:01 PM    Specimen: Blood   Result Value Ref Range    Troponin T <0.010 0.000 - 0.030 ng/mL   Magnesium    Collection Time: 09/12/21  7:01 PM    Specimen: Blood   Result Value Ref Range    Magnesium 2.1 1.6 - 2.4 mg/dL   Green Top (Gel)    Collection Time: 09/12/21  7:01 PM   Result Value Ref Range    Extra Tube Hold for add-ons.    Lavender Top    Collection Time: 09/12/21  7:01 PM   Result Value Ref Range    Extra Tube hold for add-on    Gold Top - SST    Collection Time: 09/12/21  7:01 PM   Result Value Ref Range    Extra  Tube Hold for add-ons.    Light Blue Top    Collection Time: 09/12/21  7:01 PM   Result Value Ref Range    Extra Tube hold for add-on    CBC Auto Differential    Collection Time: 09/12/21  7:01 PM    Specimen: Blood   Result Value Ref Range    WBC 10.83 (H) 3.40 - 10.80 10*3/mm3    RBC 3.94 3.77 - 5.28 10*6/mm3    Hemoglobin 11.9 (L) 12.0 - 15.9 g/dL    Hematocrit 35.9 34.0 - 46.6 %    MCV 91.1 79.0 - 97.0 fL    MCH 30.2 26.6 - 33.0 pg    MCHC 33.1 31.5 - 35.7 g/dL    RDW 12.1 (L) 12.3 - 15.4 %    RDW-SD 39.7 37.0 - 54.0 fl    MPV 9.3 6.0 - 12.0 fL    Platelets 296 140 - 450 10*3/mm3    Neutrophil % 80.0 (H) 42.7 - 76.0 %    Lymphocyte % 9.1 (L) 19.6 - 45.3 %    Monocyte % 9.8 5.0 - 12.0 %    Eosinophil % 0.2 (L) 0.3 - 6.2 %    Basophil % 0.3 0.0 - 1.5 %    Immature Grans % 0.6 (H) 0.0 - 0.5 %    Neutrophils, Absolute 8.67 (H) 1.70 - 7.00 10*3/mm3    Lymphocytes, Absolute 0.99 0.70 - 3.10 10*3/mm3    Monocytes, Absolute 1.06 (H) 0.10 - 0.90 10*3/mm3    Eosinophils, Absolute 0.02 0.00 - 0.40 10*3/mm3    Basophils, Absolute 0.03 0.00 - 0.20 10*3/mm3    Immature Grans, Absolute 0.06 (H) 0.00 - 0.05 10*3/mm3    nRBC 0.0 0.0 - 0.2 /100 WBC   CK    Collection Time: 09/12/21  7:01 PM    Specimen: Blood   Result Value Ref Range    Creatine Kinase 59 20 - 180 U/L   TSH    Collection Time: 09/12/21  7:01 PM    Specimen: Blood   Result Value Ref Range    TSH 4.310 (H) 0.270 - 4.200 uIU/mL   T4, Free    Collection Time: 09/12/21  7:01 PM    Specimen: Blood   Result Value Ref Range    Free T4 1.06 0.93 - 1.70 ng/dL   ECG 12 Lead    Collection Time: 09/12/21  9:13 PM   Result Value Ref Range    QT Interval 375 ms   Urinalysis With Microscopic If Indicated (No Culture) - Urine, Catheter    Collection Time: 09/12/21 10:10 PM    Specimen: Urine, Catheter   Result Value Ref Range    Color, UA Dark Yellow (A) Yellow, Straw    Appearance, UA Cloudy (A) Clear    pH, UA 6.0 5.0 - 8.0    Specific Gravity, UA 1.010 1.005 - 1.030     Glucose, UA Negative Negative    Ketones, UA Negative Negative    Bilirubin, UA Negative Negative    Blood, UA Trace (A) Negative    Protein, UA Trace (A) Negative    Leuk Esterase, UA Large (3+) (A) Negative    Nitrite, UA Positive (A) Negative    Urobilinogen, UA 0.2 E.U./dL 0.2 - 1.0 E.U./dL   Urinalysis, Microscopic Only - Urine, Catheter    Collection Time: 09/12/21 10:10 PM    Specimen: Urine, Catheter   Result Value Ref Range    RBC, UA 3-5 (A) None Seen, 0-2 /HPF    WBC, UA Too Numerous to Count (A) None Seen, 0-2 /HPF    Bacteria, UA 4+ (A) None Seen /HPF    Squamous Epithelial Cells, UA 0-2 None Seen, 0-2 /HPF    Hyaline Casts, UA 0-2 None Seen /LPF    Methodology Automated Microscopy        Ordered the above labs and independently reviewed the results.        RADIOLOGY  XR Chest 1 View    Result Date: 9/12/2021  PORTABLE CHEST  HISTORY:  Weakness, dizziness.  COMPARISON: None.  A single view of the chest demonstrates the heart to be within normal limits in size. There is no evidence of consolidation, effusion or congestive failure. Minimal linear atelectasis/scarring is noted involving the lung bases bilaterally.  This report was finalized on 9/12/2021 7:06 PM by Dr. Esdras Sellers M.D.        I ordered the above noted radiological studies. Reviewed by me and discussed with radiologist.  See dictation for official radiology interpretation.      PROCEDURES    Procedures      MEDICATIONS GIVEN IN ER    Medications   sodium chloride 0.9 % flush 10 mL (has no administration in time range)   cefTRIAXone (ROCEPHIN) 1 g in sodium chloride 0.9 % 100 mL IVPB-VTB (has no administration in time range)         PROGRESS, DATA ANALYSIS, CONSULTS, AND MEDICAL DECISION MAKING    All labs have been independently reviewed by me.  All radiology studies have been reviewed by me and discussed with radiologist dictating the report.   EKG's independently viewed and interpreted by me.  Discussion below represents my analysis of  pertinent findings related to patient's condition, differential diagnosis, treatment plan and final disposition.    Differential diagnosis includes electrolyte abnormality, thyroid abnormality, fibromyalgia, arthritic pain, inflammatory myopathy, COVID-19, other infectious process.    ED Course as of Sep 12 2248   Sun Sep 12, 2021   2103 Chest x-ray interpreted by myself.  No evidence of pneumonia.    [TD]   2104 WBC(!): 10.83 [TD]   2132 EKG interpreted by myself.  Time 2113.  Sinus rhythm.  Heart rate 84.  Left atrial normality.  Normal intervals and axis.  No acute ST abnormality.    [TD]   2150 Creatine Kinase: 59 [TD]   2150 TSH Baseline(!): 4.310 [TD]   2150 Free T4: 1.06 [TD]      ED Course User Index  [TD] Deepak Beckford II, MD       I discussed the case with ARMINDA Goodwin for [facility].  We reviewed the patient's labs and imaging.  She has been on nitrofurantoin.  I suspect this is insufficiently treating her UTI causing her weakness and inability to ambulate in the emergency department.  I believe that she would benefit from hospitalization.    PPE: Both the patient and I wore a surgical mask throughout the entire patient encounter. I wore protective goggles.     AS OF 22:48 EDT VITALS:    BP - 141/82  HR - 91  TEMP - 98 °F (36.7 °C) (Tympanic)  O2 SATS - 93%        DIAGNOSIS  Final diagnoses:   Generalized weakness   Unable to ambulate   Complicated UTI (urinary tract infection)         DISPOSITION  Admit           Deepak Beckford II, MD  09/12/21 4511

## 2021-09-13 NOTE — PLAN OF CARE
Goal Outcome Evaluation:  Plan of Care Reviewed With: patient, son           Outcome Summary: Pt seen for PT this afternoon. She was admitted to [facility] yesterday with acute UTI, generalized weakness, and increased arthritic pain. Pt w hx of RA, HTN, CAD, and HLD. At baseline, pt lives at home with her  and is normally independent with mobility and ADLs. She does have walker at home. Pt reports that mobility has been more difficulty over past year due to recent arthritis diagnosis. Today, pt presents with increased pain, generalized weakness, decreased activity tolerance, and overall decreased functional mobility. Pt required CGA/min A with bed mobility and transfers. She ambulated approx 20 ft in room with Rwx and CGA. She moves slowly, limited by pain and fatigue. Pt plans home at [place]. SHe has family support. She will continue to benefit from skilled PT to maximize safety, strength, and independence with mobility.

## 2021-09-13 NOTE — ED NOTES
Pt updated on status. Awaiting transport. Pt turned onto her right side for comfort and propped with pillow.     Fern Aguilar RN  09/13/21 0253

## 2021-09-13 NOTE — THERAPY EVALUATION
Patient Name: Patricia Donahue  : 1940    MRN: [id number]                              Today's Date: 2021       Admit Date: 2021    Visit Dx:     ICD-10-CM ICD-9-CM   1. Generalized weakness  R53.1 780.79   2. Unable to ambulate  R26.2 719.7   3. Complicated UTI (urinary tract infection)  N39.0 599.0     Patient Active Problem List   Diagnosis   • Generalized weakness   • Acute UTI (urinary tract infection)   • Hypertension   • Hypothyroidism   • Carotid artery disease (CMS/HCC)   • History of TIA (transient ischemic attack)   • HLD (hyperlipidemia)   • GERD (gastroesophageal reflux disease)   • Rheumatoid arthritis involving multiple sites (CMS/[facility])     History reviewed. No pertinent past medical history.  History reviewed. No pertinent surgical history.  General Information     [facility] Name 21 1555          OT Time and Intention    Document Type  evaluation  -     Mode of Treatment  individual therapy;occupational therapy  -[facility] Name 21 1555          General Information    Patient Profile Reviewed  yes  -     Prior Level of Function  independent:;ADL's  -     Existing Precautions/Restrictions  fall  -[facility] Name 21 1555          Living Environment    Lives With  spouse  -[facility] Name 21 1555          Home Main Entrance    Number of Stairs, Main Entrance  none  -[facility] Name 21 155          Cognition    Orientation Status (Cognition)  oriented x 4  -[facility] Name 21 1555          Safety Issues, Functional Mobility    Impairments Affecting Function (Mobility)  endurance/activity tolerance;strength;pain  -       User Key  (r) = Recorded By, (t) = Taken By, (c) = Cosigned By    Initials Name Provider Type     Anamika Richard OT Occupational Therapist          Mobility/ADL's     Row Name 21 1556          Bed Mobility    Bed Mobility  supine-sit;sit-supine  -     Supine-Sit [place] (Bed Mobility)  verbal cues;minimum assist (75%  patient effort)  -     Assistive Device (Bed Mobility)  head of bed elevated;bed rails  -     Comment (Bed Mobility)  left sitting EOB c RN and family present  -JW     Row Name 09/13/21 1556          Transfers    Transfers  toilet transfer;sit-stand transfer  -     Sit-Stand [place] (Transfers)  verbal cues;contact guard;minimum assist (75% patient effort)  -     [place] Level (Toilet Transfer)  verbal cues;contact guard;minimum assist (75% patient effort)  -     Assistive Device (Toilet Transfer)  walker, front-wheeled  -[facility] Name 09/13/21 1556          Sit-Stand Transfer    Assistive Device (Sit-Stand Transfers)  walker, front-wheeled  -JW     Row Name 09/13/21 1556          Functional Mobility    Functional Mobility- Ind. Level  minimum assist (75% patient effort);contact guard assist;verbal cues required  -     Functional Mobility- Comment  fxl ambulation into bathroom using RW  -JW     Row Name 09/13/21 1556          Activities of Daily Living    BADL Assessment/Intervention  toileting;grooming  -JW     Row Name 09/13/21 1556          Toileting Assessment/Training    [place] Level (Toileting)  toileting skills;minimum assist (75% patient effort)  -     Position (Toileting)  supported sitting;supported standing  -JW     Row Name 09/13/21 1556          Grooming Assessment/Training    [place] Level (Grooming)  wash face, hands  -     Position (Grooming)  sink side;supported standing  -       User Key  (r) = Recorded By, (t) = Taken By, (c) = Cosigned By    Initials Name Provider Type     Anamika Richard OT Occupational Therapist        Obj/Interventions     Row Name 09/13/21 1557          Range of Motion Comprehensive    General Range of Motion  upper extremity range of motion deficits identified  -     Comment, General Range of Motion  L shoulder c limited AROM, RUE WFL  -[facility] Name 09/13/21 1557          Strength Comprehensive (MMT)    General Manual Muscle  Testing (MMT) Assessment  upper extremity strength deficits identified  -     Comment, General Manual Muscle Testing (MMT) Assessment  generalized weakness  -[facility] Name 09/13/21 1557          Balance    Static Sitting Balance  WNL  -     Static Standing Balance  WNL  -     Dynamic Standing Balance  mild impairment  -     Balance Interventions  occupation based/functional task;sitting;supported;standing;sit to stand;static;dynamic  -       User Key  (r) = Recorded By, (t) = Taken By, (c) = Cosigned By    Initials Name Provider Type    JW Anamika Richard OT Occupational Therapist        Goals/Plan     Row Name 09/13/21 1604          Transfer Goal 1 (OT)    Activity/Assistive Device (Transfer Goal 1, OT)  transfers, all  -     [place] Level/Cues Needed (Transfer Goal 1, OT)  modified independence  -JW     Time Frame (Transfer Goal 1, OT)  short term goal (STG);2 weeks  -     Progress/Outcome (Transfer Goal 1, OT)  goal ongoing  -JW     Row Name 09/13/21 1604          Dressing Goal 1 (OT)    Activity/Device (Dressing Goal 1, OT)  dressing skills, all  -     [place]/Cues Needed (Dressing Goal 1, OT)  modified independence  -     Time Frame (Dressing Goal 1, OT)  short term goal (STG)  -     Progress/Outcome (Dressing Goal 1, OT)  goal ongoing  -JW     Row Name 09/13/21 1604          Toileting Goal 1 (OT)    Activity/Device (Toileting Goal 1, OT)  toileting skills, all  -     [place] Level/Cues Needed (Toileting Goal 1, OT)  modified independence  -     Time Frame (Toileting Goal 1, OT)  short term goal (STG);2 weeks  -     Progress/Outcome (Toileting Goal 1, OT)  goal ongoing  -JW     Row Name 09/13/21 1604          Grooming Goal 1 (OT)    Activity/Device (Grooming Goal 1, OT)  grooming skills, all  -     [place] (Grooming Goal 1, OT)  modified independence  -     Time Frame (Grooming Goal 1, OT)  short term goal (STG);2 weeks  -     Progress/Outcome (Grooming Goal 1,  OT)  goal ongoing  -     Row Name 09/13/21 1604          Therapy Assessment/Plan (OT)    Planned Therapy Interventions (OT)  activity tolerance training;adaptive equipment training;BADL retraining;functional balance retraining;IADL retraining;occupation/activity based interventions;patient/caregiver education/training;ROM/therapeutic exercise;strengthening exercise;transfer/mobility retraining  -       User Key  (r) = Recorded By, (t) = Taken By, (c) = Cosigned By    Initials Name Provider Type    Anamika Stinson, GISELLE Occupational Therapist        Clinical Impression     [facility] Name 09/13/21 4419          Pain Assessment    Additional Documentation  Pain Scale: Numbers Pre/Post-Treatment (Group)  -[facility] Name 09/13/21 6199          Pain Scale: Numbers Pre/Post-Treatment    Pretreatment Pain Rating  6/10  -     Posttreatment Pain Rating  6/10  -     Pain Location - Orientation  generalized  -     Pre/Posttreatment Pain Comment  l shoulder and hip pain  -     Pain Intervention(s)  Ambulation/increased activity;Repositioned  -JW     Row Name 09/13/21 3455          Plan of Care Review    Plan of Care Reviewed With  patient;son  -     Outcome Summary  Pt is a 81yo female who presents to [facility] c acute UTI, generalized weakness, and RA exacerbation. H/O: RA, CAD, TIA. Pt lives c her  in a home c 2 serg. She reports being independent c ADls and fxl mobility but does report recent increased difficulty completing d/t RA. Has a SC and RW available. Pt presents today A&Ox4, pleasant and agreeable despite generalized arthritic pain greatest in L shoulder and hips. EOB c CGA, STS and fxl ambulation c CGA-Harshal using RW. Fxl ambulation into bathroom, tf c RW and cues c assist to adjust clothingt. Grooming standing at the sink c SPV. Pt returns to bedside and is left EOB c RN and son present. Pt will benefit from OT services in the acute care setting c recommended d/c home c family assist.  -[facility] Name 09/13/21  1558          Therapy Assessment/Plan (OT)    Rehab Potential (OT)  good, to achieve stated therapy goals  -     Criteria for Skilled Therapeutic Interventions Met (OT)  yes;skilled treatment is necessary  -     Therapy Frequency (OT)  5 times/wk  -     Row Name 09/13/21 1558          Therapy Plan Review/Discharge Plan (OT)    Equipment Needs Upon Discharge (OT)  tub bench  -JW     Anticipated Discharge Disposition (OT)  home;home with assist  -     Row Name 09/13/21 1558          Positioning and Restraints    Pre-Treatment Position  in bed  -JW     Post Treatment Position  bed  -JW     In Bed  sitting EOB;call light within reach;encouraged to call for assist;sitting;with family/caregiver  -JW     Other Position  with nsg  -JW       User Key  (r) = Recorded By, (t) = Taken By, (c) = Cosigned By    Initials Name Provider Type    Anamika Stinson, GISELLE Occupational Therapist        Outcome Measures     Row Name 09/13/21 1605          How much help from another is currently needed...    Putting on and taking off regular lower body clothing?  2  -JW     Bathing (including washing, rinsing, and drying)  3  -JW     Toileting (which includes using toilet bed pan or urinal)  3  -JW     Putting on and taking off regular upper body clothing  3  -JW     Taking care of personal grooming (such as brushing teeth)  3  -JW     Eating meals  4  -JW     AM-PAC 6 Clicks Score (OT)  18  -     Row Name 09/13/21 1501          How much help from another person do you currently need...    Turning from your back to your side while in flat bed without using bedrails?  3  -EJ     Moving from lying on back to sitting on the side of a flat bed without bedrails?  3  -EJ     Moving to and from a bed to a chair (including a wheelchair)?  3  -EJ     Standing up from a chair using your arms (e.g., wheelchair, bedside chair)?  3  -EJ     Climbing 3-5 steps with a railing?  2  -EJ     To walk in hospital room?  3  -EJ     AM-PAC 6 Clicks Score  (PT)  17  -EJ     Row Name 09/13/21 1605 09/13/21 1501       Functional Assessment    Outcome Measure Options  AM-PAC 6 Clicks Daily Activity (OT)  -  AM-PAC 6 Clicks Basic Mobility (PT)  -      User Key  (r) = Recorded By, (t) = Taken By, (c) = Cosigned By    Initials Name Provider Type    Bárbara Gaytan, PT Physical Therapist    Anamika Stinson OT Occupational Therapist          Occupational Therapy Education                 Title: PT OT SLP Therapies (In Progress)     Topic: Occupational Therapy (Done)     Point: ADL training (Done)     Description:   Instruct learner(s) on proper safety adaptation and remediation techniques during self care or transfers.   Instruct in proper use of assistive devices.              Learning Progress Summary           Patient Acceptance, E, VU,NR by JOANNE at 9/13/2021 1606    Comment: role of OT, safety, plan of care                   Point: Home exercise program (Done)     Description:   Instruct learner(s) on appropriate technique for monitoring, assisting and/or progressing therapeutic exercises/activities.              Learning Progress Summary           Patient Acceptance, E, VU,NR by JOANNE at 9/13/2021 1606    Comment: role of OT, safety, plan of care                   Point: Precautions (Done)     Description:   Instruct learner(s) on prescribed precautions during self-care and functional transfers.              Learning Progress Summary           Patient Acceptance, E, VU,NR by JOANNE at 9/13/2021 1606    Comment: role of OT, safety, plan of care                   Point: Body mechanics (Done)     Description:   Instruct learner(s) on proper positioning and spine alignment during self-care, functional mobility activities and/or exercises.              Learning Progress Summary           Patient Acceptance, E, VU,NR by JOANNE at 9/13/2021 1606    Comment: role of OT, safety, plan of care                               User Key     Initials Effective Dates Name Provider Type  Discipline     06/10/21 -  Anamika Richard OT Occupational Therapist OT              OT Recommendation and Plan  Planned Therapy Interventions (OT): activity tolerance training, adaptive equipment training, BADL retraining, functional balance retraining, IADL retraining, occupation/activity based interventions, patient/caregiver education/training, ROM/therapeutic exercise, strengthening exercise, transfer/mobility retraining  Therapy Frequency (OT): 5 times/wk  Plan of Care Review  Plan of Care Reviewed With: patient, son  Outcome Summary: Pt is a 79yo female who presents to [facility] c acute UTI, generalized weakness, and RA exacerbation. H/O: RA, CAD, TIA. Pt lives c her  in a home c 2 serg. She reports being independent c ADls and fxl mobility but does report recent increased difficulty completing d/t RA. Has a SC and RW available. Pt presents today A&Ox4, pleasant and agreeable despite generalized arthritic pain greatest in L shoulder and hips. EOB c CGA, STS and fxl ambulation c CGA-Harshal using RW. Fxl ambulation into bathroom, tf c RW and cues c assist to adjust clothingt. Grooming standing at the sink c SPV. Pt returns to bedside and is left EOB c RN and son present. Pt will benefit from OT services in the acute care setting c recommended d/c home c family assist.     Time Calculation:   Time Calculation- OT     Row Name 09/13/21 1607             Time Calculation- OT    OT Start Time  1412  -      OT Stop Time  1434  -      OT Time Calculation (min)  22 min  -      Total Timed Code Minutes- OT  12 minute(s)  -      OT Received On  09/13/21  -      OT - Next Appointment  09/14/21  -      OT Goal Re-Cert Due Date  09/27/21  -         Timed Charges    98248 - OT Self Care/Mgmt Minutes  12  -         Untimed Charges    OT Eval/Re-eval Minutes  10  -JW         Total Minutes    Timed Charges Total Minutes  12  -      Untimed Charges Total Minutes  10  -JW       Total Minutes  22  -        User  Key  (r) = Recorded By, (t) = Taken By, (c) = Cosigned By    Initials Name Provider Type     Anamika Richard OT Occupational Therapist        Therapy Charges for Today     Code Description Service Date Service Provider Modifiers Qty    [id number]  OT SELF CARE/MGMT/TRAIN EA 15 MIN 9/13/2021 Anamika Richard OT GO 1    [id number]  OT EVAL MOD COMPLEXITY 2 9/13/2021 Anamika Richard OT GO 1               Anamika Richard OT  9/13/2021

## 2021-09-13 NOTE — ED NOTES
Attempted to assist pt up to w/c to take to the bathroom. Pt was unable to get up without assist, unable to stand up. Pt assisted back to bed, ER MD notified.     Fern Aguilar RN  09/12/21 2689

## 2021-09-13 NOTE — ED NOTES
Patient was placed in face mask in first look. Patient was wearing facemask when I entered the room and throughout our encounter. I wore full protective equipment throughout this patient encounter including a face mask, and gloves. Hand hygiene was performed before donning protective equipment and after removal when leaving the room.     Fern Aguilar RN  09/12/21 7269

## 2021-09-13 NOTE — PLAN OF CARE
Goal Outcome Evaluation:   PT worked w/ patient and able to ambulate to the bathroom and sit on the side of the bed. Ibuprofen 800 mg q12 PRN started. Prednisone started in AM. Patient to ambulate to bathroom or bedside commode as tolerated instead of purewick use. Much improvement from this morning.

## 2021-09-13 NOTE — PROGRESS NOTES
"Discharge Planning Assessment  [facility]     Patient Name: Patricia Donahue  MRN: [id number]  Today's Date: 9/13/2021    Admit Date: 9/12/2021    Discharge Needs Assessment     Row Name 09/13/21 1308       Living Environment    Lives With  spouse    Name(s) of Who Lives With Patient  Spouse Vinnie    Current Living Arrangements  home/apartment/condo    Provides Primary Care For  no one    Family Caregiver if Needed  child(luisa), adult;spouse    Quality of Family Relationships  helpful;involved;supportive    Able to Return to Prior Arrangements  yes       Transition Planning    Patient/Family Anticipates Transition to  home with family    Patient/Family Anticipated Services at Transition  home health care    Transportation Anticipated  family or friend will provide       Discharge Needs Assessment    Equipment Currently Used at Home  bath bench;grab bar;walker, rolling    Provided Post Acute Provider List?  N/A    N/A Provider List Comment  Requested [facility]        Discharge Plan     Row Name 09/13/21 8461       Plan    Plan  Home with HH (Referral in [facility] for [facility])    Patient/Family in Agreement with Plan  yes    Plan Comments  Introduced self and role of CCP. Son, Declan at bedside. Patient agreeable to discuss with all present. Facesheet verified. Patient lives in  a 2 stroy house with a basement. There are 2 steps to enter. There are approx 10-12 steps to upper and lower levels. Master bedroom andmaster bathroom are located on the main level. Laundry is located in the basement. Patient stated her symptoms started on Friday. Prior to that time, she was mostly independent. Stated she \"has her days\". Since Friday, has needed assistance with ADL's. Has been using a FW walker. Uses a standard tub/shower with bath bench, grab bars and hand-held nozzel. Son stated 18 months ago, she had the same thing. Used [facility] for PT. Stated Lyndsey was who came to her home. She ready liked her. Requested th use again. Has never been " to a rehab facility. DC plan is home. Referral place din [facility] for [facility]. Will continue to follow for additional needs.        Continued Care and Services - Admitted Since 9/12/2021     Home Medical Care     Service Provider Request Status Selected Services Address Phone Fax Patient Preferred    [facility]  Pending - Request Sent N/A [id number]  [facility] [address] [phone] --                Demographic Summary     Row Name 09/13/21 1302       General Information    Admission Type  observation    Arrived From  home    Reason for Consult  discharge planning    Preferred Language  English     Used During This Interaction  no        Functional Status     Row Name 09/13/21 1302       Functional Status    Usual Activity Tolerance  moderate    Current Activity Tolerance  fair       Functional Status, IADL    Medications  independent    Meal Preparation  independent    Housekeeping  independent    Laundry  independent    Shopping  independent       Mental Status    General Appearance WDL  WDL        Psychosocial     Row Name 09/13/21 1303       Values/Beliefs    Spiritual, Cultural Beliefs, Adventism Practices, Values that Affect Care  no       Behavior WDL    Behavior WDL  WDL       Emotion Mood WDL    Emotion/Mood/Affect WDL  WDL       Speech WDL    Speech WDL  WDL       Perceptual State WDL    Perceptual State WDL  WDL       Coping/Stress    Sources of Support  adult child(luisa);spouse    Reaction to Health Status  adjusting    Understanding of Condition and Treatment  adequate understanding of treatment       Developmental Stage (Eriksson's)    Developmental Stage  Stage 8 (65 years-death/Late Adulthood) Integrity vs. Despair        Abuse/Neglect     Row Name 09/13/21 1303       Personal Safety    Feels Unsafe at Home or Work/School  no    Feels Threatened by Someone  no    Does Anyone Try to Keep You From Having Contact with Others or Doing Things Outside Your Home?  no     Physical Signs of Abuse Present  no        Lulu Palmer, RN

## 2021-09-13 NOTE — THERAPY EVALUATION
Patient Name: Patricia Donahue  : 1940    MRN: [id number]                              Today's Date: 2021       Admit Date: 2021    Visit Dx:     ICD-10-CM ICD-9-CM   1. Generalized weakness  R53.1 780.79   2. Unable to ambulate  R26.2 719.7   3. Complicated UTI (urinary tract infection)  N39.0 599.0     Patient Active Problem List   Diagnosis   • Generalized weakness   • Acute UTI (urinary tract infection)   • Hypertension   • Hypothyroidism   • Carotid artery disease (CMS/[facility])   • History of TIA (transient ischemic attack)   • HLD (hyperlipidemia)   • GERD (gastroesophageal reflux disease)   • Rheumatoid arthritis involving multiple sites (CMS/[facility])     History reviewed. No pertinent past medical history.  History reviewed. No pertinent surgical history.  General Information     Row Name 21 1442          Physical Therapy Time and Intention    Document Type  evaluation  -EJ     Mode of Treatment  physical therapy  -     Row Name 21 1442          General Information    Patient Profile Reviewed  yes  -EJ     Prior Level of Function  independent:;ADL's;all household mobility;community mobility using walker, states mobility has been harder for past year with arthritis diagnosis  -EJ     Existing Precautions/Restrictions  fall  -EJ     Barriers to Rehab  none identified  -EJ     Row Name 21 1442          Living Environment    Lives With  spouse  -EJ     Row Name 21 1442          Stairs Within Home, Primary    Stairs, Within Home, Primary  basement  -EJ     Row Name 21 1442          Cognition    Orientation Status (Cognition)  oriented x 4  -EJ     Row Name 21 1442          Safety Issues, Functional Mobility    Impairments Affecting Function (Mobility)  endurance/activity tolerance;strength;pain  -EJ       User Key  (r) = Recorded By, (t) = Taken By, (c) = Cosigned By    Initials Name Provider Type    EJ Bárbara Camp, PT Physical Therapist        Mobility     Row  Name 09/13/21 1445          Bed Mobility    Bed Mobility  supine-sit;sit-supine  -EJ     Supine-Sit Gila (Bed Mobility)  verbal cues;minimum assist (75% patient effort)  -EJ     Sit-Supine Gila (Bed Mobility)  not tested  -EJ     Assistive Device (Bed Mobility)  head of bed elevated;bed rails  -EJ     Comment (Bed Mobility)  pt left sitting on EOB at end of treatment with son and RN.  -EJ     Row Name 09/13/21 1445          Sit-Stand Transfer    Sit-Stand Gila (Transfers)  verbal cues;contact guard;minimum assist (75% patient effort)  -EJ     Assistive Device (Sit-Stand Transfers)  walker, front-wheeled  -EJ     Row Name 09/13/21 1445          Gait/Stairs (Locomotion)    Gila Level (Gait)  verbal cues;contact guard  -EJ     Assistive Device (Gait)  walker, front-wheeled  -EJ     Distance in Feet (Gait)  10' x 2  -EJ     Deviations/Abnormal Patterns (Gait)  heber decreased;antalgic;stride length decreased  -EJ     Bilateral Gait Deviations  forward flexed posture;heel strike decreased  -EJ     Comment (Gait/Stairs)  slow pace, but no LOB noted. limited by pain  -EJ       User Key  (r) = Recorded By, (t) = Taken By, (c) = Cosigned By    Initials Name Provider Type    EJ Bárbara Camp, PT Physical Therapist        Obj/Interventions     Row Name 09/13/21 1449          Range of Motion Comprehensive    General Range of Motion  no range of motion deficits identified  -     Row Name 09/13/21 1449          Strength Comprehensive (MMT)    Comment, General Manual Muscle Testing (MMT) Assessment  generalized weakness, difficult to assess due to pain  -     Row Name 09/13/21 1449          Balance    Balance Assessment  sitting static balance;standing static balance;standing dynamic balance  -EJ     Static Sitting Balance  WFL  -EJ     Static Standing Balance  WFL;supported  -EJ     Dynamic Standing Balance  mild impairment;supported  -EJ       User Key  (r) = Recorded By, (t) = Taken  By, (c) = Cosigned By    Initials Name Provider Type    EJ Bárbara Camp, PT Physical Therapist        Goals/Plan     Row Name 09/13/21 1459          Bed Mobility Goal 1 (PT)    Activity/Assistive Device (Bed Mobility Goal 1, PT)  bed mobility activities, all  -EJ     [place] Level/Cues Needed (Bed Mobility Goal 1, PT)  standby assist  -EJ     Time Frame (Bed Mobility Goal 1, PT)  1 week  -EJ     Row Name 09/13/21 1459          Transfer Goal 1 (PT)    Activity/Assistive Device (Transfer Goal 1, PT)  transfers, all;walker, rolling  -EJ     [place] Level/Cues Needed (Transfer Goal 1, PT)  standby assist  -EJ     Time Frame (Transfer Goal 1, PT)  1 week  -EJ     Row Name 09/13/21 1459          Gait Training Goal 1 (PT)    Activity/Assistive Device (Gait Training Goal 1, PT)  gait (walking locomotion);walker, rolling  -EJ     [place] Level (Gait Training Goal 1, PT)  contact guard assist  -EJ     Distance (Gait Training Goal 1, PT)  100  -EJ     Time Frame (Gait Training Goal 1, PT)  1 week  -EJ       User Key  (r) = Recorded By, (t) = Taken By, (c) = Cosigned By    Initials Name Provider Type    EJ Bárbara Camp, PT Physical Therapist        Clinical Impression     Row Name 09/13/21 1450          Pain    Additional Documentation  Pain Scale: Numbers Pre/Post-Treatment (Group);Pain Scale: FACES Pre/Post-Treatment (Group)  -EJ     Row Name 09/13/21 1450          Pain Scale: Numbers Pre/Post-Treatment    Pain Location - Orientation  generalized  -EJ     Pre/Posttreatment Pain Comment  L shoulder/hip- arthritic pain  -EJ     Pain Intervention(s)  Repositioned;Ambulation/increased activity  -EJ     Row Name 09/13/21 1450          Pain Scale: FACES Pre/Post-Treatment    Pain: FACES Scale, Pretreatment  2-->hurts little bit  -EJ     Posttreatment Pain Rating  6-->hurts even more  -EJ     Row Name 09/13/21 1450          Plan of Care Review    Plan of Care Reviewed With  patient;son  -     Outcome  Summary  Pt seen for PT this afternoon. She was admitted to [facility] yesterday with acute UTI, generalized weakness, and increased arthritic pain. Pt w hx of RA, HTN, CAD, and HLD. At baseline, pt lives at home with her  and is normally independent with mobility and ADLs. She does have walker at home. Pt reports that mobility has been more difficulty over past year due to recent arthritis diagnosis. Today, pt presents with increased pain, generalized weakness, decreased activity tolerance, and overall decreased functional mobility. Pt required CGA/min A with bed mobility and transfers. She ambulated approx 20 ft in room with Rwx and CGA. She moves slowly, limited by pain and fatigue. Pt plans home at [place]. SHe has family support. She will continue to benefit from skilled PT to maximize safety, strength, and independence with mobility.  -EJ     Row Name 09/13/21 7441          Therapy Assessment/Plan (PT)    Patient/Family Therapy Goals Statement (PT)  go home and feel better  -EJ     Rehab Potential (PT)  good, to achieve stated therapy goals  -EJ     Criteria for Skilled Interventions Met (PT)  yes  -EJ     Row Name 09/13/21 1538          Positioning and Restraints    Pre-Treatment Position  in bed  -EJ     Post Treatment Position  bed  -EJ     In Bed  notified nsg;sitting EOB;call light within reach;encouraged to call for assist;with family/caregiver;with nsg  -EJ       User Key  (r) = Recorded By, (t) = Taken By, (c) = Cosigned By    Initials Name Provider Type    Bárbara Gaytan, PT Physical Therapist        Outcome Measures     Row Name 09/13/21 4251          How much help from another person do you currently need...    Turning from your back to your side while in flat bed without using bedrails?  3  -EJ     Moving from lying on back to sitting on the side of a flat bed without bedrails?  3  -EJ     Moving to and from a bed to a chair (including a wheelchair)?  3  -EJ     Standing up from a chair using  your arms (e.g., wheelchair, bedside chair)?  3  -EJ     Climbing 3-5 steps with a railing?  2  -EJ     To walk in hospital room?  3  -EJ     AM-PAC 6 Clicks Score (PT)  17  -     Row Name 09/13/21 1501          Functional Assessment    Outcome Measure Options  AM-PAC 6 Clicks Basic Mobility (PT)  -       User Key  (r) = Recorded By, (t) = Taken By, (c) = Cosigned By    Initials Name Provider Type    EJ Bárbara Camp, PT Physical Therapist                       Physical Therapy Education                 Title: PT OT SLP Therapies (In Progress)     Topic: Physical Therapy (In Progress)     Point: Mobility training (Done)     Learning Progress Summary           Patient Acceptance, E,TB,D, VU,NR by  at 9/13/2021 1501                   Point: Home exercise program (Not Started)     Learner Progress:  Not documented in this visit.          Point: Body mechanics (Not Started)     Learner Progress:  Not documented in this visit.          Point: Precautions (Not Started)     Learner Progress:  Not documented in this visit.                      User Key     Initials Effective Dates Name Provider Type [facility] 06/16/21 -  Bárbara Camp, PT Physical Therapist PT              PT Recommendation and Plan  Planned Therapy Interventions (PT): balance training, bed mobility training, gait training, home exercise program, patient/family education, strengthening, ROM (range of motion), stair training, transfer training  Plan of Care Reviewed With: patient, son  Outcome Summary: Pt seen for PT this afternoon. She was admitted to [facility] yesterday with acute UTI, generalized weakness, and increased arthritic pain. Pt w hx of RA, HTN, CAD, and HLD. At baseline, pt lives at home with her  and is normally independent with mobility and ADLs. She does have walker at home. Pt reports that mobility has been more difficulty over past year due to recent arthritis diagnosis. Today, pt presents with increased pain,  generalized weakness, decreased activity tolerance, and overall decreased functional mobility. Pt required CGA/min A with bed mobility and transfers. She ambulated approx 20 ft in room with Rwx and CGA. She moves slowly, limited by pain and fatigue. Pt plans home at [place]. SHe has family support. She will continue to benefit from skilled PT to maximize safety, strength, and independence with mobility.     Time Calculation:   PT Charges     Row Name 09/13/21 1503             Time Calculation    Start Time  1410  -EJ      Stop Time  1434  -EJ      Time Calculation (min)  24 min  -EJ      PT Received On  09/13/21  -EJ      PT - Next Appointment  09/14/21  -EJ      PT Goal Re-Cert Due Date  09/20/21  -EJ         Time Calculation- PT    Total Timed Code Minutes- PT  16 minute(s)  -EJ        User Key  (r) = Recorded By, (t) = Taken By, (c) = Cosigned By    Initials Name Provider Type    EJ Bárbara Camp, PT Physical Therapist        Therapy Charges for Today     Code Description Service Date Service Provider Modifiers Qty    94371969899 HC PT EVAL MOD COMPLEXITY 2 9/13/2021 Bárbara Camp, PT GP 1    41579154148 HC PT THER PROC EA 15 MIN 9/13/2021 Bárbara Camp, PT GP 1          PT G-Codes  Outcome Measure Options: AM-PAC 6 Clicks Basic Mobility (PT)  AM-PAC 6 Clicks Score (PT): 17    Bárbara Camp PT  9/13/2021

## 2021-09-13 NOTE — ED NOTES
"Pt states \"I am having severe pain in my joints, I cant even get up out of bed on my own. And have since being urinating on myself because I am unable to get up. I went to the Dr Friday, got Dx of a UTI she put me on an antibiotic 1 pill Q12hrs and the stuff that makes your pee orange.\" Pt states \"Every since then I have had chills, and having bladder spasms.\" Pt denies N&V, diarrhea, chest pain, and SOB. Pt is vaccinated and denies contact with anyone who may have covid-19. Pt in NAD at this time, ABC's intact.     Patient was placed in face mask during first look triage.  Patient was wearing a face mask throughout encounter.  I wore personal protective equipment throughout the encounter.  Hand hygiene was performed before and after patient encounter.       Sparkle Torrez, RN  09/12/21 2296    "

## 2021-09-13 NOTE — DISCHARGE PLACEMENT REQUEST
"Yinka Donahue (80 y.o. Female)     Date of Birth Social Security Number Address Home Phone MRN    1940  [id number] [address] [phone] [id number]    [employer] Marital Status          Other        Admission Date Admission Type Admitting Provider Attending Provider Department, Room/Bed    9/12/21 Emergency Babs La MD Hetzman, Margaux M, MD [address], P690/1    Discharge Date Discharge Disposition Discharge Destination                       Attending Provider: Babs La MD    Allergies: No Known Allergies    Isolation: None   Infection: None   Code Status: CPR    Ht: 162.6 cm (64\")   Wt: 63.5 kg (140 lb)    Admission Cmt: None   Principal Problem: Acute UTI (urinary tract infection) [N39.0]                 Active Insurance as of 9/12/2021     Primary Coverage     Payor Plan Insurance Group Employer/Plan Group    MEDICARE MEDICARE A & B      Payor Plan Address Payor Plan Phone Number Payor Plan Fax Number Effective Dates    [address] [phone]  12/1/2007 - None Entered    [facility] [id number]       Subscriber Name Subscriber Birth Date Member ID       YINKA DONAHUE 1940 [id number]           Secondary Coverage     Payor Plan Insurance Group Employer/Plan Group    AETNA COMMERCIAL AETNA [id number]     Payor Plan Address Payor Plan Phone Number Payor Plan Fax Number Effective Dates    [address] [phone]  1/1/2018 - None Entered    [facility] [address]       Subscriber Name Subscriber Birth Date Member ID       YINKA DONAHUE 1940 [id number]                 Emergency Contacts      (Rel.) Home Phone Work Phone Mobile Phone    aliya skelton (Daughter) -- -- [phone]              "

## 2021-09-13 NOTE — PLAN OF CARE
Goal Outcome Evaluation:  Plan of Care Reviewed With: patient           Outcome Summary: Admit to floor 0330, c/o of weakness and joint pain upon arrival, vss, afebrile, fall precautions and bed alarm in use for safety, purewick, room air, regular diet

## 2021-09-13 NOTE — PLAN OF CARE
Goal Outcome Evaluation:  Plan of Care Reviewed With: patient, son       Outcome Summary: Pt is a 79yo female who presents to [facility] c acute UTI, generalized weakness, and RA exacerbation. H/O: RA, CAD, TIA. Pt lives c her  in a home c 2 serg. She reports being independent c ADls and fxl mobility but does report recent increased difficulty completing d/t RA. Has a SC and RW available. Pt presents today A&Ox4, pleasant and agreeable despite generalized arthritic pain greatest in L shoulder and hips. EOB c CGA, STS and fxl ambulation c CGA-Harshal using RW. Fxl ambulation into bathroom, tf c RW and cues c assist to adjust clothingt. Grooming standing at the sink c SPV. Pt returns to bedside and is left EOB c RN and son present. Pt will benefit from OT services in the acute care setting c recommended d/c home c family assist.  Patient was not wearing a face mask during this therapy encounter. Therapist used appropriate personal protective equipment including mask, gloves, and eye protection.  Mask used was standard procedure mask. Appropriate PPE was worn during the entire therapy session. Hand hygiene was completed before and after therapy session. Patient is not in enhanced droplet precautions.

## 2021-09-14 LAB
ANION GAP SERPL CALCULATED.3IONS-SCNC: 12.4 MMOL/L (ref 5–15)
BASOPHILS # BLD AUTO: 0.01 10*3/MM3 (ref 0–0.2)
BASOPHILS NFR BLD AUTO: 0.1 % (ref 0–1.5)
BUN SERPL-MCNC: 18 MG/DL (ref 8–23)
BUN/CREAT SERPL: 30.5 (ref 7–25)
CALCIUM SPEC-SCNC: 9.1 MG/DL (ref 8.6–10.5)
CHLORIDE SERPL-SCNC: 101 MMOL/L (ref 98–107)
CO2 SERPL-SCNC: 22.6 MMOL/L (ref 22–29)
CREAT SERPL-MCNC: 0.59 MG/DL (ref 0.57–1)
DEPRECATED RDW RBC AUTO: 39.7 FL (ref 37–54)
EOSINOPHIL # BLD AUTO: 0 10*3/MM3 (ref 0–0.4)
EOSINOPHIL NFR BLD AUTO: 0 % (ref 0.3–6.2)
ERYTHROCYTE [DISTWIDTH] IN BLOOD BY AUTOMATED COUNT: 12.1 % (ref 12.3–15.4)
GFR SERPL CREATININE-BSD FRML MDRD: 99 ML/MIN/1.73
GLUCOSE SERPL-MCNC: 156 MG/DL (ref 65–99)
HCT VFR BLD AUTO: 33 % (ref 34–46.6)
HGB BLD-MCNC: 11 G/DL (ref 12–15.9)
IMM GRANULOCYTES # BLD AUTO: 0.07 10*3/MM3 (ref 0–0.05)
IMM GRANULOCYTES NFR BLD AUTO: 0.8 % (ref 0–0.5)
LYMPHOCYTES # BLD AUTO: 0.66 10*3/MM3 (ref 0.7–3.1)
LYMPHOCYTES NFR BLD AUTO: 7.1 % (ref 19.6–45.3)
MAGNESIUM SERPL-MCNC: 2.3 MG/DL (ref 1.6–2.4)
MCH RBC QN AUTO: 30.1 PG (ref 26.6–33)
MCHC RBC AUTO-ENTMCNC: 33.3 G/DL (ref 31.5–35.7)
MCV RBC AUTO: 90.2 FL (ref 79–97)
MONOCYTES # BLD AUTO: 0.33 10*3/MM3 (ref 0.1–0.9)
MONOCYTES NFR BLD AUTO: 3.6 % (ref 5–12)
NEUTROPHILS NFR BLD AUTO: 8.17 10*3/MM3 (ref 1.7–7)
NEUTROPHILS NFR BLD AUTO: 88.4 % (ref 42.7–76)
NRBC BLD AUTO-RTO: 0 /100 WBC (ref 0–0.2)
PHOSPHATE SERPL-MCNC: 3.5 MG/DL (ref 2.5–4.5)
PLATELET # BLD AUTO: 329 10*3/MM3 (ref 140–450)
PMV BLD AUTO: 10 FL (ref 6–12)
POTASSIUM SERPL-SCNC: 3.6 MMOL/L (ref 3.5–5.2)
RBC # BLD AUTO: 3.66 10*6/MM3 (ref 3.77–5.28)
SODIUM SERPL-SCNC: 136 MMOL/L (ref 136–145)
WBC # BLD AUTO: 9.24 10*3/MM3 (ref 3.4–10.8)

## 2021-09-14 PROCEDURE — 80048 BASIC METABOLIC PNL TOTAL CA: CPT | Performed by: STUDENT IN AN ORGANIZED HEALTH CARE EDUCATION/TRAINING PROGRAM

## 2021-09-14 PROCEDURE — 63710000001 DIPHENHYDRAMINE PER 50 MG: Performed by: NURSE PRACTITIONER

## 2021-09-14 PROCEDURE — 97110 THERAPEUTIC EXERCISES: CPT

## 2021-09-14 PROCEDURE — 63710000001 PREDNISONE PER 5 MG: Performed by: STUDENT IN AN ORGANIZED HEALTH CARE EDUCATION/TRAINING PROGRAM

## 2021-09-14 PROCEDURE — 83735 ASSAY OF MAGNESIUM: CPT | Performed by: STUDENT IN AN ORGANIZED HEALTH CARE EDUCATION/TRAINING PROGRAM

## 2021-09-14 PROCEDURE — 25010000002 CEFTRIAXONE PER 250 MG: Performed by: NURSE PRACTITIONER

## 2021-09-14 PROCEDURE — 85025 COMPLETE CBC W/AUTO DIFF WBC: CPT | Performed by: STUDENT IN AN ORGANIZED HEALTH CARE EDUCATION/TRAINING PROGRAM

## 2021-09-14 PROCEDURE — 97530 THERAPEUTIC ACTIVITIES: CPT

## 2021-09-14 PROCEDURE — 84100 ASSAY OF PHOSPHORUS: CPT | Performed by: STUDENT IN AN ORGANIZED HEALTH CARE EDUCATION/TRAINING PROGRAM

## 2021-09-14 RX ADMIN — SODIUM CHLORIDE, PRESERVATIVE FREE 10 ML: 5 INJECTION INTRAVENOUS at 20:29

## 2021-09-14 RX ADMIN — CEFTRIAXONE 1 G: 1 INJECTION, POWDER, FOR SOLUTION INTRAMUSCULAR; INTRAVENOUS at 23:04

## 2021-09-14 RX ADMIN — LEVOTHYROXINE SODIUM 100 MCG: 0.1 TABLET ORAL at 06:20

## 2021-09-14 RX ADMIN — NIFEDIPINE 90 MG: 60 TABLET, FILM COATED, EXTENDED RELEASE ORAL at 08:55

## 2021-09-14 RX ADMIN — PREDNISONE 30 MG: 20 TABLET ORAL at 08:55

## 2021-09-14 RX ADMIN — PANTOPRAZOLE SODIUM 40 MG: 40 TABLET, DELAYED RELEASE ORAL at 06:20

## 2021-09-14 RX ADMIN — IBUPROFEN 800 MG: 400 TABLET, FILM COATED ORAL at 09:42

## 2021-09-14 RX ADMIN — IBUPROFEN 800 MG: 400 TABLET, FILM COATED ORAL at 20:29

## 2021-09-14 RX ADMIN — ASPIRIN 81 MG: 81 TABLET, CHEWABLE ORAL at 08:55

## 2021-09-14 RX ADMIN — LOSARTAN POTASSIUM 100 MG: 100 TABLET, FILM COATED ORAL at 08:55

## 2021-09-14 RX ADMIN — SODIUM CHLORIDE, PRESERVATIVE FREE 10 ML: 5 INJECTION INTRAVENOUS at 08:52

## 2021-09-14 RX ADMIN — DIPHENHYDRAMINE HYDROCHLORIDE 25 MG: 25 CAPSULE ORAL at 23:04

## 2021-09-14 RX ADMIN — ACETAMINOPHEN 650 MG: 325 TABLET, FILM COATED ORAL at 06:23

## 2021-09-14 NOTE — PLAN OF CARE
Goal Outcome Evaluation:  Plan of Care Reviewed With: patient        Progress: improving  Outcome Summary: Pt making small improvements w/ short amb to BR toilet then 25' in room. Pt req CGA/SV and use of fww for STS and gait. Pt slow w/ no LOB and limited by fatigue. PT will prog as pt bettye.    Patient was not wearing a face mask during this therapy encounter. Therapist used appropriate personal protective equipment including eye protection, mask, and gloves.  Mask used was standard procedure mask. Appropriate PPE was worn during the entire therapy session. Hand hygiene was completed before and after therapy session. Patient is not in enhanced droplet precautions.

## 2021-09-14 NOTE — PLAN OF CARE
Goal Outcome Evaluation:  Plan of Care Reviewed With: patient        Progress: improving  Outcome Summary: vss, c/o pain medicated with ibuprofen, up with assist and using a walker, bed alarm for safety, on oral prednisone 1st dose given yesterday, daugther at bedside, continue to monitor the pt.

## 2021-09-14 NOTE — PROGRESS NOTES
Name: Patricia Donahue ADMIT: 2021   : 1940  PCP: Provider, No Known    MRN: [id number] LOS: 1 days   AGE/SEX: 80 y.o. female  ROOM: [facility]     Subjective   Subjective   Patient appears much improved today, she has increased neck mobility and she reports feeling better as well.  She denies fevers, chills, nausea/vomiting.    Review of Systems   As above  Objective   Objective   Vital Signs  Temp:  [96.8 °F (36 °C)-97.5 °F (36.4 °C)] 97.1 °F (36.2 °C)  Heart Rate:  [67-81] 74  Resp:  [16] 16  BP: (116-142)/(65-77) 134/71  SpO2:  [94 %-98 %] 97 %  on   ;   Device (Oxygen Therapy): room air  Body mass index is 24.03 kg/m².  Physical Exam  Constitutional:       General: She is not in acute distress.     Appearance: Normal appearance. She is normal weight. She is not ill-appearing.   HENT:      Head: Normocephalic and atraumatic.      Nose: Nose normal.      Mouth/Throat:      Mouth: Mucous membranes are moist.      Pharynx: Oropharynx is clear.   Eyes:      Extraocular Movements: Extraocular movements intact.      Conjunctiva/sclera: Conjunctivae normal.      Pupils: Pupils are equal, round, and reactive to light.   Neck:      Comments: Chronic limited neck range of motion  Cardiovascular:      Rate and Rhythm: Normal rate and regular rhythm.      Pulses: Normal pulses.   Pulmonary:      Effort: Pulmonary effort is normal. No respiratory distress.      Breath sounds: Normal breath sounds. No wheezing.   Abdominal:      General: Abdomen is flat. Bowel sounds are normal. There is no distension.      Palpations: Abdomen is soft.   Musculoskeletal:         General: No swelling or tenderness. Normal range of motion.      Cervical back: Neck supple.      Right lower leg: No edema.      Left lower leg: No edema.   Skin:     General: Skin is warm and dry.   Neurological:      General: No focal deficit present.      Mental Status: She is alert and oriented to person, place, and time. Mental status is at baseline.    Psychiatric:         Mood and Affect: Mood normal.         Behavior: Behavior normal.         Thought Content: Thought content normal.         Judgment: Judgment normal.         Results Review     I reviewed the patient's new clinical results.  Results from last 7 days   Lab Units 09/14/21  0853 09/13/21  0806 09/12/21  1901   WBC 10*3/mm3 9.24 9.27 10.83*   HEMOGLOBIN g/dL 11.0* 10.4* 11.9*   PLATELETS 10*3/mm3 329 278 296     Results from last 7 days   Lab Units 09/14/21  0853 09/13/21  0806 09/12/21  1901   SODIUM mmol/L 136 135* 135*   POTASSIUM mmol/L 3.6 3.8 4.0   CHLORIDE mmol/L 101 102 99   CO2 mmol/L 22.6 23.1 24.3   BUN mg/dL 18 12 11   CREATININE mg/dL 0.59 0.61 0.73   GLUCOSE mg/dL 156* 97 97   Estimated Creatinine Clearance: 58.1 mL/min (by C-G formula based on SCr of 0.59 mg/dL).  Results from last 7 days   Lab Units 09/12/21  1901   ALBUMIN g/dL 4.00   BILIRUBIN mg/dL 0.2   ALK PHOS U/L 85   AST (SGOT) U/L 14   ALT (SGPT) U/L 14     Results from last 7 days   Lab Units 09/14/21  0853 09/13/21  0806 09/12/21  1901   CALCIUM mg/dL 9.1 8.7 9.5   ALBUMIN g/dL  --   --  4.00   MAGNESIUM mg/dL 2.3  --  2.1   PHOSPHORUS mg/dL 3.5  --   --      Results from last 7 days   Lab Units 09/12/21  2334   LACTATE mmol/L 0.8     COVID19   Date Value Ref Range Status   09/12/2021 Not Detected Not Detected - Ref. Range Final     No results found for: HGBA1C, POCGLU    XR Chest 1 View  PORTABLE CHEST     HISTORY:  Weakness, dizziness.     COMPARISON: None.     A single view of the chest demonstrates the heart to be within normal  limits in size. There is no evidence of consolidation, effusion or  congestive failure. Minimal linear atelectasis/scarring is noted  involving the lung bases bilaterally.      This report was finalized on 9/12/2021 7:06 PM by Dr. Esdras Sellers M.D.       Scheduled Medications  aspirin, 81 mg, Oral, Daily  cefTRIAXone, 1 g, Intravenous, Q24H  diphenhydrAMINE, 25 mg, Oral,  Nightly  levothyroxine, 100 mcg, Oral, Q AM  losartan, 100 mg, Oral, Q24H  NIFEdipine XL, 90 mg, Oral, Daily  pantoprazole, 40 mg, Oral, QAM  predniSONE, 30 mg, Oral, Daily With Breakfast  sodium chloride, 10 mL, Intravenous, Q12H    Infusions   Diet  Diet Regular       Assessment/Plan     Active Hospital Problems    Diagnosis  POA   • **Acute UTI (urinary tract infection) [N39.0]  Unknown   • Hypertension [I10]  Unknown   • Hypothyroidism [E03.9]  Unknown   • Carotid artery disease (CMS/HCC) [I77.9]  Unknown   • History of TIA (transient ischemic attack) [Z86.73]  Not Applicable   • HLD (hyperlipidemia) [E78.5]  Unknown   • GERD (gastroesophageal reflux disease) [K21.9]  Unknown   • Rheumatoid arthritis involving multiple sites (CMS/[facility]) [M06.9]  Unknown   • Generalized weakness [R53.1]  Yes      Resolved Hospital Problems   No resolved problems to display.       80 y.o. female admitted with Acute UTI (urinary tract infection).    Acute UTI  -Continue Rocephin 1 gm daily  -Urine culture not sent; blood cultures no growth to date     Rheumatoid Arthritis  -Prednisone 30mg daily; plan for taper at discharge, will need rheumatology follow-up, patient has rheumatologist outpatient already  -PT/OT consults  -Monitor     Hypertension  -Blood pressures stable.  Continue home regimen  -Monitor     Hypothyroidism  -S/p partial thyroidectomy, 2002  -Synthroid 100 mcg     Hyperlipidemia/Carotid Artery Disease/TIA  -S/p left carotid endarterectomy 4/20/2018  -Continue home medications     GERD  -Continue home PPI     -I discussed the patients findings and my recommendations with patient.     VTE Prophylaxis - SCDs.  Code Status - Full code.    Babs La MD  [facility]  09/14/21  13:45 EDT    Patient was wearing facemask when I entered the room and throughout our encounter.  I wore protective equipment throughout this patient encounter including a face mask, gloves and protective eyewear.  Hand  hygiene was performed before donning protective equipment and after removal when leaving the room.

## 2021-09-14 NOTE — PLAN OF CARE
Goal Outcome Evaluation:  Plan of Care Reviewed With: patient        Progress: improving  Outcome Summary: pt improving, experiencing less pain, worked with PT, daughter at bedside this am and son at bedside pm, vss, pt may be D/C tomorrow

## 2021-09-14 NOTE — THERAPY TREATMENT NOTE
Patient Name: Patricia Donahue  : 1940    MRN: [id number]                              Today's Date: 2021       Admit Date: 2021    Visit Dx:     ICD-10-CM ICD-9-CM   1. Generalized weakness  R53.1 780.79   2. Unable to ambulate  R26.2 719.7   3. Complicated UTI (urinary tract infection)  N39.0 599.0     Patient Active Problem List   Diagnosis   • Generalized weakness   • Acute UTI (urinary tract infection)   • Hypertension   • Hypothyroidism   • Carotid artery disease (CMS/[facility])   • History of TIA (transient ischemic attack)   • HLD (hyperlipidemia)   • GERD (gastroesophageal reflux disease)   • Rheumatoid arthritis involving multiple sites (CMS/[facility])     History reviewed. No pertinent past medical history.  History reviewed. No pertinent surgical history.  General Information     [facility] Name 21          Physical Therapy Time and Intention    Document Type  therapy note (daily note)  -     Mode of Treatment  physical therapy  -[facility] Name 21 1333          General Information    Existing Precautions/Restrictions  fall  -[facility] Name 21 1333          Safety Issues, Functional Mobility    Impairments Affecting Function (Mobility)  endurance/activity tolerance;strength;balance  -       User Key  (r) = Recorded By, (t) = Taken By, (c) = Cosigned By    Initials Name Provider Type     Bharati Sanchez PTA Physical Therapy Assistant        Mobility     Row Name 21          Bed Mobility    Bed Mobility  supine-sit;sit-supine  -PH     Supine-Sit [place] (Bed Mobility)  supervision;verbal cues  -     Sit-Supine [place] (Bed Mobility)  supervision;verbal cues  -     Comment (Bed Mobility)  extra time overall and to move R LE to EOB and back into bed  -     Row Name 21 1333          Transfers    Comment (Transfers)  pt politely declining chair; STS x 2; from EOB/from toilet; vc for use of side bar  by bathroom toilet  -     Row Name 21  1333          Sit-Stand Transfer    Sit-Stand [place] (Transfers)  supervision;contact guard;verbal cues  -PH     Assistive Device (Sit-Stand Transfers)  walker, front-wheeled  -PH     Row Name 09/14/21 1333          Gait/Stairs (Locomotion)    [place] Level (Gait)  contact guard  -PH     Assistive Device (Gait)  walker, front-wheeled  -PH     Distance in Feet (Gait)  10' then approx 25'  -PH     Deviations/Abnormal Patterns (Gait)  heber decreased;gait speed decreased;stride length decreased  -PH     Bilateral Gait Deviations  forward flexed posture;heel strike decreased  -PH     [place] Level (Stairs)  unable to assess  -PH     Comment (Gait/Stairs)  pt slow w/ no LOB; limited by fatigue  -PH       User Key  (r) = Recorded By, (t) = Taken By, (c) = Cosigned By    Initials Name Provider Type     Bharati Sanchez PTA Physical Therapy Assistant        Obj/Interventions     [facility] Name 09/14/21 1336          Motor Skills    Therapeutic Exercise  other (see comments) R abd, B: AP, LAQ, seated march, SAQ, SLR; x 10-15 reps  -PH     [facility] Name 09/14/21 1336          Balance    Balance Assessment  standing static balance;sitting static balance  -PH     Static Sitting Balance  WNL  -PH     Static Standing Balance  WFL;unsupported;standing  -PH     Comment, Balance  pt stood for approx 60 sec as brief was adjusted and pulled up by toilet.  -PH       User Key  (r) = Recorded By, (t) = Taken By, (c) = Cosigned By    Initials Name Provider Type     Bharati Sanchez PTA Physical Therapy Assistant        Goals/Plan    No documentation.       Clinical Impression     Row Name 09/14/21 1337          Pain    Additional Documentation  Pain Scale: Numbers Pre/Post-Treatment (Group)  -PH     Row Name 09/14/21 1337          Pain Scale: Numbers Pre/Post-Treatment    Pretreatment Pain Rating  0/10 - no pain  -PH     Posttreatment Pain Rating  0/10 - no pain  -PH     Pain Intervention(s)  Ambulation/increased  activity;Repositioned  -PH     Row Name 09/14/21 1337          Plan of Care Review    Plan of Care Reviewed With  patient  -PH     Progress  improving  -PH     Outcome Summary  Pt making small improvements w/ short amb to BR toilet then 25' in room. Pt req CGA/SV and use of fww for STS and gait. Pt slow w/ no LOB and limited by fatigue. PT will prog as pt bettye.  -PH     Row Name 09/14/21 1337          Positioning and Restraints    Pre-Treatment Position  in bed  -PH     Post Treatment Position  bed  -PH     In Bed  fowlers;call light within reach;encouraged to call for assist;exit alarm on  -PH       User Key  (r) = Recorded By, (t) = Taken By, (c) = Cosigned By    Initials Name Provider Type     Bharati Sanchez PTA Physical Therapy Assistant        Outcome Measures     Row Name 09/14/21 1340          How much help from another person do you currently need...    Turning from your back to your side while in flat bed without using bedrails?  3  -PH     Moving from lying on back to sitting on the side of a flat bed without bedrails?  3  -PH     Moving to and from a bed to a chair (including a wheelchair)?  3  -PH     Standing up from a chair using your arms (e.g., wheelchair, bedside chair)?  3  -PH     Climbing 3-5 steps with a railing?  2  -PH     To walk in hospital room?  3  -PH     AM-PAC 6 Clicks Score (PT)  17  -PH     Row Name 09/14/21 1340          Functional Assessment    Outcome Measure Options  AM-PAC 6 Clicks Basic Mobility (PT)  -PH       User Key  (r) = Recorded By, (t) = Taken By, (c) = Cosigned By    Initials Name Provider Type     Bharati Sanchez PTA Physical Therapy Assistant                       Physical Therapy Education                 Title: PT OT SLP Therapies (Done)     Topic: Physical Therapy (Done)     Point: Mobility training (Done)     Learning Progress Summary           Patient Acceptance, E,D, VU,NR by  at 9/14/2021 1340    Acceptance, E,TB, VU by MT at 9/14/2021  0448    Acceptance, E,TB,D, VU,NR by  at 9/13/2021 1501                   Point: Home exercise program (Done)     Learning Progress Summary           Patient Acceptance, E,D, VU,NR by  at 9/14/2021 1340                   Point: Body mechanics (Done)     Learning Progress Summary           Patient Acceptance, E,D, VU,NR by  at 9/14/2021 1340    Acceptance, E,TB, VU by MT at 9/14/2021 0448                   Point: Precautions (Done)     Learning Progress Summary           Patient Acceptance, E,D, VU,NR by  at 9/14/2021 1340    Acceptance, E,TB, VU by MT at 9/14/2021 0448                               User Key     Initials Effective Dates Name Provider Type Discipline    MT 06/16/21 -  Jillian Noel, RN Registered Nurse Nurse     06/16/21 -  Bárbara Camp, PT Physical Therapist PT     06/16/21 -  Bharati Sanchez PTA Physical Therapy Assistant PT              PT Recommendation and Plan     Plan of Care Reviewed With: patient  Progress: improving  Outcome Summary: Pt making small improvements w/ short amb to BR toilet then 25' in room. Pt req CGA/SV and use of fww for STS and gait. Pt slow w/ no LOB and limited by fatigue. PT will prog as pt bettye.     Time Calculation:   PT Charges     Row Name 09/14/21 1341             Time Calculation    Start Time  1302  -PH      Stop Time  1329  -PH      Time Calculation (min)  27 min  -PH      PT Received On  09/14/21  -PH      PT - Next Appointment  09/15/21  -PH         Timed Charges    82999 - PT Therapeutic Exercise Minutes  9  -PH      21306 - PT Therapeutic Activity Minutes  18  -PH         Total Minutes    Timed Charges Total Minutes  27  -PH       Total Minutes  27  -PH        User Key  (r) = Recorded By, (t) = Taken By, (c) = Cosigned By    Initials Name Provider Type     Bharati Sanchez PTA Physical Therapy Assistant        Therapy Charges for Today     Code Description Service Date Service Provider Modifiers Qty    68250526535  PT  THER PROC EA 15 MIN 9/14/2021 Bharati Sanchez, PTA GP 1    00193799971 HC PT THERAPEUTIC ACT EA 15 MIN 9/14/2021 Bharati Sanchez, MEME GP 1          PT G-Codes  Outcome Measure Options: AM-PAC 6 Clicks Basic Mobility (PT)  AM-PAC 6 Clicks Score (PT): 17  AM-PAC 6 Clicks Score (OT): 18    Bharati Sanchez PTA  9/14/2021

## 2021-09-15 LAB
ANION GAP SERPL CALCULATED.3IONS-SCNC: 10.7 MMOL/L (ref 5–15)
BASOPHILS # BLD AUTO: 0.02 10*3/MM3 (ref 0–0.2)
BASOPHILS NFR BLD AUTO: 0.2 % (ref 0–1.5)
BUN SERPL-MCNC: 14 MG/DL (ref 8–23)
BUN/CREAT SERPL: 26.9 (ref 7–25)
CALCIUM SPEC-SCNC: 8.4 MG/DL (ref 8.6–10.5)
CHLORIDE SERPL-SCNC: 106 MMOL/L (ref 98–107)
CO2 SERPL-SCNC: 24.3 MMOL/L (ref 22–29)
CREAT SERPL-MCNC: 0.52 MG/DL (ref 0.57–1)
DEPRECATED RDW RBC AUTO: 38.3 FL (ref 37–54)
EOSINOPHIL # BLD AUTO: 0.04 10*3/MM3 (ref 0–0.4)
EOSINOPHIL NFR BLD AUTO: 0.4 % (ref 0.3–6.2)
ERYTHROCYTE [DISTWIDTH] IN BLOOD BY AUTOMATED COUNT: 12 % (ref 12.3–15.4)
GFR SERPL CREATININE-BSD FRML MDRD: 114 ML/MIN/1.73
GLUCOSE SERPL-MCNC: 75 MG/DL (ref 65–99)
HCT VFR BLD AUTO: 31.2 % (ref 34–46.6)
HGB BLD-MCNC: 10.5 G/DL (ref 12–15.9)
IMM GRANULOCYTES # BLD AUTO: 0.08 10*3/MM3 (ref 0–0.05)
IMM GRANULOCYTES NFR BLD AUTO: 0.8 % (ref 0–0.5)
LYMPHOCYTES # BLD AUTO: 1.19 10*3/MM3 (ref 0.7–3.1)
LYMPHOCYTES NFR BLD AUTO: 11.9 % (ref 19.6–45.3)
MAGNESIUM SERPL-MCNC: 2.2 MG/DL (ref 1.6–2.4)
MCH RBC QN AUTO: 29.9 PG (ref 26.6–33)
MCHC RBC AUTO-ENTMCNC: 33.7 G/DL (ref 31.5–35.7)
MCV RBC AUTO: 88.9 FL (ref 79–97)
MONOCYTES # BLD AUTO: 1.08 10*3/MM3 (ref 0.1–0.9)
MONOCYTES NFR BLD AUTO: 10.8 % (ref 5–12)
NEUTROPHILS NFR BLD AUTO: 7.61 10*3/MM3 (ref 1.7–7)
NEUTROPHILS NFR BLD AUTO: 75.9 % (ref 42.7–76)
NRBC BLD AUTO-RTO: 0 /100 WBC (ref 0–0.2)
PHOSPHATE SERPL-MCNC: 2.3 MG/DL (ref 2.5–4.5)
PHOSPHATE SERPL-MCNC: 2.3 MG/DL (ref 2.5–4.5)
PLATELET # BLD AUTO: 328 10*3/MM3 (ref 140–450)
PMV BLD AUTO: 9.8 FL (ref 6–12)
POTASSIUM SERPL-SCNC: 3.4 MMOL/L (ref 3.5–5.2)
RBC # BLD AUTO: 3.51 10*6/MM3 (ref 3.77–5.28)
SODIUM SERPL-SCNC: 141 MMOL/L (ref 136–145)
WBC # BLD AUTO: 10.02 10*3/MM3 (ref 3.4–10.8)

## 2021-09-15 PROCEDURE — 63710000001 PREDNISONE PER 1 MG: Performed by: STUDENT IN AN ORGANIZED HEALTH CARE EDUCATION/TRAINING PROGRAM

## 2021-09-15 PROCEDURE — 80048 BASIC METABOLIC PNL TOTAL CA: CPT | Performed by: STUDENT IN AN ORGANIZED HEALTH CARE EDUCATION/TRAINING PROGRAM

## 2021-09-15 PROCEDURE — 25010000002 CEFTRIAXONE PER 250 MG: Performed by: NURSE PRACTITIONER

## 2021-09-15 PROCEDURE — 63710000001 PREDNISONE PER 5 MG: Performed by: STUDENT IN AN ORGANIZED HEALTH CARE EDUCATION/TRAINING PROGRAM

## 2021-09-15 PROCEDURE — 83735 ASSAY OF MAGNESIUM: CPT | Performed by: STUDENT IN AN ORGANIZED HEALTH CARE EDUCATION/TRAINING PROGRAM

## 2021-09-15 PROCEDURE — 97110 THERAPEUTIC EXERCISES: CPT

## 2021-09-15 PROCEDURE — 63710000001 DIPHENHYDRAMINE PER 50 MG: Performed by: NURSE PRACTITIONER

## 2021-09-15 PROCEDURE — 85025 COMPLETE CBC W/AUTO DIFF WBC: CPT | Performed by: STUDENT IN AN ORGANIZED HEALTH CARE EDUCATION/TRAINING PROGRAM

## 2021-09-15 PROCEDURE — 84100 ASSAY OF PHOSPHORUS: CPT | Performed by: STUDENT IN AN ORGANIZED HEALTH CARE EDUCATION/TRAINING PROGRAM

## 2021-09-15 RX ORDER — MAGNESIUM SULFATE HEPTAHYDRATE 40 MG/ML
4 INJECTION, SOLUTION INTRAVENOUS AS NEEDED
Status: DISCONTINUED | OUTPATIENT
Start: 2021-09-15 | End: 2021-09-16 | Stop reason: HOSPADM

## 2021-09-15 RX ORDER — MAGNESIUM SULFATE HEPTAHYDRATE 40 MG/ML
2 INJECTION, SOLUTION INTRAVENOUS AS NEEDED
Status: DISCONTINUED | OUTPATIENT
Start: 2021-09-15 | End: 2021-09-16 | Stop reason: HOSPADM

## 2021-09-15 RX ORDER — POTASSIUM CHLORIDE 750 MG/1
40 TABLET, FILM COATED, EXTENDED RELEASE ORAL AS NEEDED
Status: DISCONTINUED | OUTPATIENT
Start: 2021-09-15 | End: 2021-09-16 | Stop reason: HOSPADM

## 2021-09-15 RX ORDER — POTASSIUM CHLORIDE 1.5 G/1.77G
40 POWDER, FOR SOLUTION ORAL AS NEEDED
Status: DISCONTINUED | OUTPATIENT
Start: 2021-09-15 | End: 2021-09-16 | Stop reason: HOSPADM

## 2021-09-15 RX ADMIN — IBUPROFEN 800 MG: 400 TABLET, FILM COATED ORAL at 08:45

## 2021-09-15 RX ADMIN — IBUPROFEN 800 MG: 400 TABLET, FILM COATED ORAL at 22:34

## 2021-09-15 RX ADMIN — SODIUM CHLORIDE, PRESERVATIVE FREE 10 ML: 5 INJECTION INTRAVENOUS at 22:34

## 2021-09-15 RX ADMIN — POTASSIUM CHLORIDE 40 MEQ: 750 TABLET, EXTENDED RELEASE ORAL at 14:53

## 2021-09-15 RX ADMIN — PANTOPRAZOLE SODIUM 40 MG: 40 TABLET, DELAYED RELEASE ORAL at 05:42

## 2021-09-15 RX ADMIN — SODIUM CHLORIDE, PRESERVATIVE FREE 10 ML: 5 INJECTION INTRAVENOUS at 08:48

## 2021-09-15 RX ADMIN — PREDNISONE 30 MG: 20 TABLET ORAL at 08:47

## 2021-09-15 RX ADMIN — LOSARTAN POTASSIUM 100 MG: 100 TABLET, FILM COATED ORAL at 08:47

## 2021-09-15 RX ADMIN — ACETAMINOPHEN 650 MG: 325 TABLET, FILM COATED ORAL at 12:48

## 2021-09-15 RX ADMIN — NIFEDIPINE 90 MG: 60 TABLET, FILM COATED, EXTENDED RELEASE ORAL at 08:47

## 2021-09-15 RX ADMIN — ASPIRIN 81 MG: 81 TABLET, CHEWABLE ORAL at 08:47

## 2021-09-15 RX ADMIN — ACETAMINOPHEN 650 MG: 325 TABLET, FILM COATED ORAL at 05:41

## 2021-09-15 RX ADMIN — CEFTRIAXONE 1 G: 1 INJECTION, POWDER, FOR SOLUTION INTRAMUSCULAR; INTRAVENOUS at 22:34

## 2021-09-15 RX ADMIN — LEVOTHYROXINE SODIUM 100 MCG: 0.1 TABLET ORAL at 05:42

## 2021-09-15 RX ADMIN — Medication 2 PACKET: at 15:57

## 2021-09-15 RX ADMIN — POTASSIUM CHLORIDE 40 MEQ: 750 TABLET, EXTENDED RELEASE ORAL at 22:50

## 2021-09-15 RX ADMIN — DIPHENHYDRAMINE HYDROCHLORIDE 25 MG: 25 CAPSULE ORAL at 22:34

## 2021-09-15 NOTE — PLAN OF CARE
Goal Outcome Evaluation:  Plan of Care Reviewed With: patient        Progress: no change  Outcome Summary: Pt tolerating PT today after reporting she did not sleep well and in pain after walking 400' in [place] w/ staff last night. Pt agreeable to TE at EOB and supine w/ 10-15 reps each performed. PT will prog as pt bettye.    Patient was not wearing a face mask during this therapy encounter. Therapist used appropriate personal protective equipment including eye protection, mask, and gloves.  Mask used was standard procedure mask. Appropriate PPE was worn during the entire therapy session. Hand hygiene was completed before and after therapy session. Patient is not in enhanced droplet precautions.

## 2021-09-15 NOTE — CONSULTS
"Adult Nutrition  Assessment/PES    Patient Name:  Patricia Donahue  YOB: 1940  MRN: [id number]  Admit Date:  9/12/2021    Assessment Date:  9/15/2021    Comments:  Nutrition Assessment: RN Screen   80 y.o female admitted with acute UTI.  H/o rheumatoid arthritis.  Patient reports eating well. % x 4 meals. +bowel movement.  Patient does report some pain overnight related to arthritis but it has improved today.    Nutrition Appropriate for Condition at this Time.   RD to continue to follow.      Reason for Assessment     Row Name 09/15/21 1202          Reason for Assessment    Reason For Assessment  nurse/nurse practitioner consult     Diagnosis  other (see comments) Acute UTI, RA, HTN, hypothyroid, GERD, HLD, CAD     Identified At Risk by Screening Criteria  no indicators present         Nutrition/Diet History     Row Name 09/15/21 1203          Nutrition/Diet History    Typical Food/Fluid Intake  Patient reports eating well but having some pain. PO intake % x 4 meals         Anthropometrics     Row Name 09/15/21 1204          Anthropometrics    Height  162.6 cm (64.02\")     Weight  63.5 kg (140 lb) not weighed by RD        Ideal Body Weight (IBW)    Ideal Body Weight (IBW) (kg)  55.04     % Ideal Body Weight  115.38        Body Mass Index (BMI)    BMI (kg/m2)  24.07     BMI Assessment  BMI 18.5-24.9: normal         Labs/Tests/Procedures/Meds     Row Name 09/15/21 1204          Labs/Procedures/Meds    Lab Results Reviewed  reviewed, pertinent     Lab Results Comments  K, Cr, Phos        Diagnostic Tests/Procedures    Diagnostic Test/Procedure Reviewed  reviewed        Medications    Pertinent Medications Reviewed  reviewed, pertinent     Pertinent Medications Comments  Prednisone, Rocephine, Levothyroxine, Protonix         Physical Findings     Row Name 09/15/21 1205          Physical Findings    Skin  other (see comments) B: 20         Estimated/Assessed Needs     Row Name 09/15/21 1204    " "      Calculation Measurements    Height  162.6 cm (64.02\")         Nutrition Prescription Ordered     Row Name 09/15/21 1205          Nutrition Prescription PO    Current PO Diet  Regular     Fluid Consistency  Thin         Evaluation of Received Nutrient/Fluid Intake     Row Name 09/15/21 1206          PO Evaluation    Number of Meals  4     % PO Intake                 Problem/Interventions:  Problem 1     Row Name 09/15/21 1206          Nutrition Diagnoses Problem 1    Problem 1  Nutrition Appropriate for Condition at this Time           Intervention Goal     Row Name 09/15/21 1206          Intervention Goal    General  Maintain nutrition;Reduce/improve symptoms;Meet nutritional needs for age/condition     PO  Maintain intake;PO intake (%)     PO Intake %  75 %     Weight  Maintain weight         Nutrition Intervention     Row Name 09/15/21 1206          Nutrition Intervention    RD/Tech Action  Care plan reviewd;Follow Tx progress;Encourage intake           Education/Evaluation     Row Name 09/15/21 1207          Education    Education  Will Instruct as appropriate        Monitor/Evaluation    Monitor  Per protocol;PO intake;I&O;Weight;Symptoms     Education Follow-up  Reinforce PRN           Electronically signed by:  Luna Vásquez RD  09/15/21 12:07 EDT  "

## 2021-09-15 NOTE — PLAN OF CARE
Problem: Adult Inpatient Plan of Care  Goal: Plan of Care Review  Outcome: Ongoing, Progressing  Flowsheets (Taken 9/15/2021 4627)  Progress: improving  Plan of Care Reviewed With: patient  Outcome Summary: doing well, s/o Lt upper back and Rt knee pain medicated with Motrin, falls precaution maintained, daughter at bedside, slept well, same IV antibiotics continued, VSS, possible home in am   Goal Outcome Evaluation:  Plan of Care Reviewed With: patient        Progress: improving  Outcome Summary: doing well, s/o Lt upper back and Rt knee pain medicated with Motrin, falls precaution maintained, daughter at bedside, slept well, same IV antibiotics continued, VSS, possible home in am

## 2021-09-15 NOTE — PROGRESS NOTES
Name: Patricia Donahue ADMIT: 2021   : 1940  PCP: Provider, No Known    MRN: [id number] LOS: 2 days   AGE/SEX: 80 y.o. female  ROOM: [facility]     Subjective   Subjective   Patient states she feels like she is doing worse today, she appears anxious and borderline tearful.  She reports improvement in her LUTS, but states she feels more sore in her head neck and hips.    Review of Systems   As above  Objective   Objective   Vital Signs  Temp:  [97 °F (36.1 °C)-98 °F (36.7 °C)] 97.1 °F (36.2 °C)  Heart Rate:  [70-86] 79  Resp:  [16] 16  BP: (116-155)/(59-83) 121/74  SpO2:  [95 %-96 %] 96 %  on   ;   Device (Oxygen Therapy): room air  Body mass index is 24.02 kg/m².  Physical Exam  Constitutional:       General: She is not in acute distress.     Appearance: Normal appearance. She is normal weight. She is not ill-appearing.   HENT:      Head: Normocephalic and atraumatic.      Nose: Nose normal.      Mouth/Throat:      Mouth: Mucous membranes are moist.      Pharynx: Oropharynx is clear.   Eyes:      Extraocular Movements: Extraocular movements intact.      Conjunctiva/sclera: Conjunctivae normal.      Pupils: Pupils are equal, round, and reactive to light.   Neck:      Comments: Chronic limited neck range of motion  Cardiovascular:      Rate and Rhythm: Normal rate and regular rhythm.      Pulses: Normal pulses.   Pulmonary:      Effort: Pulmonary effort is normal. No respiratory distress.      Breath sounds: Normal breath sounds. No wheezing.   Abdominal:      General: Abdomen is flat. Bowel sounds are normal. There is no distension.      Palpations: Abdomen is soft.   Musculoskeletal:         General: No swelling or tenderness. Normal range of motion.      Cervical back: Neck supple.      Right lower leg: No edema.      Left lower leg: No edema.   Skin:     General: Skin is warm and dry.   Neurological:      General: No focal deficit present.      Mental Status: She is alert and oriented to person, place,  and time. Mental status is at baseline.   Psychiatric:         Mood and Affect: Mood normal.         Behavior: Behavior normal.         Thought Content: Thought content normal.         Judgment: Judgment normal.       Results Review     I reviewed the patient's new clinical results.  Results from last 7 days   Lab Units 09/15/21  0734 09/14/21  0853 09/13/21  0806 09/12/21  1901   WBC 10*3/mm3 10.02 9.24 9.27 10.83*   HEMOGLOBIN g/dL 10.5* 11.0* 10.4* 11.9*   PLATELETS 10*3/mm3 328 329 278 296     Results from last 7 days   Lab Units 09/15/21  0734 09/14/21  0853 09/13/21  0806 09/12/21  1901   SODIUM mmol/L 141 136 135* 135*   POTASSIUM mmol/L 3.4* 3.6 3.8 4.0   CHLORIDE mmol/L 106 101 102 99   CO2 mmol/L 24.3 22.6 23.1 24.3   BUN mg/dL 14 18 12 11   CREATININE mg/dL 0.52* 0.59 0.61 0.73   GLUCOSE mg/dL 75 156* 97 97   Estimated Creatinine Clearance: 58.1 mL/min (A) (by C-G formula based on SCr of 0.52 mg/dL (L)).  Results from last 7 days   Lab Units 09/12/21  1901   ALBUMIN g/dL 4.00   BILIRUBIN mg/dL 0.2   ALK PHOS U/L 85   AST (SGOT) U/L 14   ALT (SGPT) U/L 14     Results from last 7 days   Lab Units 09/15/21  0734 09/14/21  0853 09/13/21  0806 09/12/21  1901   CALCIUM mg/dL 8.4* 9.1 8.7 9.5   ALBUMIN g/dL  --   --   --  4.00   MAGNESIUM mg/dL 2.2 2.3  --  2.1   PHOSPHORUS mg/dL 2.3* 3.5  --   --      Results from last 7 days   Lab Units 09/12/21  2334   LACTATE mmol/L 0.8     COVID19   Date Value Ref Range Status   09/12/2021 Not Detected Not Detected - Ref. Range Final     No results found for: HGBA1C, POCGLU    XR Chest 1 View  PORTABLE CHEST     HISTORY:  Weakness, dizziness.     COMPARISON: None.     A single view of the chest demonstrates the heart to be within normal  limits in size. There is no evidence of consolidation, effusion or  congestive failure. Minimal linear atelectasis/scarring is noted  involving the lung bases bilaterally.      This report was finalized on 9/12/2021 7:06 PM by Dr. Dewitt  YANELIS Sellers       Scheduled Medications  aspirin, 81 mg, Oral, Daily  cefTRIAXone, 1 g, Intravenous, Q24H  diphenhydrAMINE, 25 mg, Oral, Nightly  levothyroxine, 100 mcg, Oral, Q AM  losartan, 100 mg, Oral, Q24H  NIFEdipine XL, 90 mg, Oral, Daily  pantoprazole, 40 mg, Oral, QAM  predniSONE, 30 mg, Oral, Daily With Breakfast  sodium chloride, 10 mL, Intravenous, Q12H    Infusions   Diet  Diet Regular       Assessment/Plan     Active Hospital Problems    Diagnosis  POA   • **Acute UTI (urinary tract infection) [N39.0]  Unknown   • Hypertension [I10]  Unknown   • Hypothyroidism [E03.9]  Unknown   • Carotid artery disease (CMS/HCC) [I77.9]  Unknown   • History of TIA (transient ischemic attack) [Z86.73]  Not Applicable   • HLD (hyperlipidemia) [E78.5]  Unknown   • GERD (gastroesophageal reflux disease) [K21.9]  Unknown   • Rheumatoid arthritis involving multiple sites (CMS/[facility]) [M06.9]  Unknown   • Generalized weakness [R53.1]  Yes      Resolved Hospital Problems   No resolved problems to display.       80 y.o. female admitted with Acute UTI (urinary tract infection).       Acute UTI  -Continue Rocephin 1 gm daily  -Urine culture not sent; blood cultures no growth to date  - will transition to PO tomorrow    Electrolyte derangements  - started on mag, phos, K+ replacement protocol today     Rheumatoid Arthritis  -Prednisone 30mg daily; plan for taper at discharge, will need rheumatology follow-up, patient has rheumatologist outpatient already  -PT/OT consults  -Monitor     Hypertension  -Blood pressures stable.  Continue home regimen  -Monitor     Hypothyroidism  -S/p partial thyroidectomy, 2002  -Synthroid 100 mcg     Hyperlipidemia/Carotid Artery Disease/TIA  -S/p left carotid endarterectomy 4/20/2018  -Continue home medications     GERD  -Continue home PPI     -I discussed the patients findings and my recommendations with patient.    VTE Prophylaxis - SCDs.  Code Status - Full code.    Babs La,  MD [facility]  09/15/21  15:18 EDT    Patient was wearing facemask when I entered the room and throughout our encounter.  I wore protective equipment throughout this patient encounter including a face mask, gloves and protective eyewear.  Hand hygiene was performed before donning protective equipment and after removal when leaving the room.

## 2021-09-15 NOTE — THERAPY TREATMENT NOTE
Patient Name: Patricia Donahue  : 1940    MRN: [id number]                              Today's Date: 9/15/2021       Admit Date: 2021    Visit Dx:     ICD-10-CM ICD-9-CM   1. Generalized weakness  R53.1 780.79   2. Unable to ambulate  R26.2 719.7   3. Complicated UTI (urinary tract infection)  N39.0 599.0     Patient Active Problem List   Diagnosis   • Generalized weakness   • Acute UTI (urinary tract infection)   • Hypertension   • Hypothyroidism   • Carotid artery disease (CMS/HCC)   • History of TIA (transient ischemic attack)   • HLD (hyperlipidemia)   • GERD (gastroesophageal reflux disease)   • Rheumatoid arthritis involving multiple sites (CMS/[facility])     History reviewed. No pertinent past medical history.  History reviewed. No pertinent surgical history.  General Information     Row Name 09/15/21 1316          Physical Therapy Time and Intention    Document Type  therapy note (daily note)  -PH     Mode of Treatment  physical therapy  -PH     Row Name 09/15/21 1316          General Information    Existing Precautions/Restrictions  fall  -PH     Row Name 09/15/21 1316          Safety Issues, Functional Mobility    Impairments Affecting Function (Mobility)  endurance/activity tolerance;strength;pain;balance  -PH       User Key  (r) = Recorded By, (t) = Taken By, (c) = Cosigned By    Initials Name Provider Type    PH Bharati Sanchez PTA Physical Therapy Assistant        Mobility     Row Name 09/15/21 1316          Bed Mobility    Bed Mobility  supine-sit  -PH     Supine-Sit [place] (Bed Mobility)  verbal cues;contact guard  -PH     Sit-Supine [place] (Bed Mobility)  minimum assist (75% patient effort);verbal cues  -PH     Assistive Device (Bed Mobility)  bed rails;head of bed elevated  -PH     Comment (Bed Mobility)  Pt states she is in pain today and extremely tired after not sleeping well and amb in the hallway w/ staff yesterday. Pt encouraged to work w/ PT and was agreeable to TE  seated and in bed only today. Extra time to move BLE to EOB today w/ assist.  -PH     Row Name 09/15/21 1316          Bed-Chair Transfer    Bed-Chair [place] (Transfers)  unable to assess  -PH     [facility] Name 09/15/21 1316          Sit-Stand Transfer    Sit-Stand [place] (Transfers)  unable to assess  -PH     [facility] Name 09/15/21 1316          Gait/Stairs (Locomotion)    [place] Level (Gait)  unable to assess  -PH     [place] Level (Stairs)  unable to assess  -PH       User Key  (r) = Recorded By, (t) = Taken By, (c) = Cosigned By    Initials Name Provider Type     Bharati Sanchez PTA Physical Therapy Assistant        Obj/Interventions     [facility] Name 09/15/21 1318          Motor Skills    Therapeutic Exercise  other (see comments) BAP, LAQ, seated march; 2 x 15; punches, scapular pro/retraction; x 15 each; hip abd, SAQ, SLR w/ assist; x 10 - 15 reps each  -PH     Row Name 09/15/21 1318          Balance    Static Sitting Balance  WNL  -       User Key  (r) = Recorded By, (t) = Taken By, (c) = Cosigned By    Initials Name Provider Type     Bharati Sanchez PTA Physical Therapy Assistant        Goals/Plan    No documentation.       Clinical Impression     [facility] Name 09/15/21 1321          Pain    Additional Documentation  Pain Scale: Numbers Pre/Post-Treatment (Group)  -PH     Row Name 09/15/21 1321          Pain Scale: Numbers Pre/Post-Treatment    Pretreatment Pain Rating  5/10  -PH     Posttreatment Pain Rating  5/10  -PH     Pre/Posttreatment Pain Comment  pain in R knee and L  neck and shoulder  -     Pain Intervention(s)  Repositioned;Rest  -[facility] Name 09/15/21 1321          Plan of Care Review    Plan of Care Reviewed With  patient  -     Progress  no change  -     Outcome Summary  Pt tolerating PT today after reporting she did not sleep well and in pain after walking 400' in [place] w/ staff last night. Pt agreeable to TE at EOB and supine w/ 10-15 reps each performed. PT  will prog as pt bettye.  -PH     Row Name 09/15/21 1321          Positioning and Restraints    Pre-Treatment Position  in bed  -PH     Post Treatment Position  bed  -PH     In Bed  fowlers;call light within reach;encouraged to call for assist;exit alarm on  -PH       User Key  (r) = Recorded By, (t) = Taken By, (c) = Cosigned By    Initials Name Provider Type     Bharati Sanchez PTA Physical Therapy Assistant        Outcome Measures     Row Name 09/15/21 1323          How much help from another person do you currently need...    Turning from your back to your side while in flat bed without using bedrails?  3  -PH     Moving from lying on back to sitting on the side of a flat bed without bedrails?  3  -PH     Moving to and from a bed to a chair (including a wheelchair)?  3  -PH     Standing up from a chair using your arms (e.g., wheelchair, bedside chair)?  3  -PH     Climbing 3-5 steps with a railing?  2  -PH     To walk in hospital room?  3  -PH     AM-PAC 6 Clicks Score (PT)  17  -PH     Row Name 09/15/21 1323          Functional Assessment    Outcome Measure Options  AM-PAC 6 Clicks Basic Mobility (PT)  -PH       User Key  (r) = Recorded By, (t) = Taken By, (c) = Cosigned By    Initials Name Provider Type     Bharati Sanchez PTA Physical Therapy Assistant                       Physical Therapy Education                 Title: PT OT SLP Therapies (Done)     Topic: Physical Therapy (Done)     Point: Mobility training (Done)     Learning Progress Summary           Patient Acceptance, E,D, VU,NR by  at 9/15/2021 1323    Acceptance, E,D, VU,NR by  at 9/14/2021 1340    Acceptance, E,TB, VU by MT at 9/14/2021 0448    Acceptance, E,TB,D, VU,NR by EJ at 9/13/2021 1501                   Point: Home exercise program (Done)     Learning Progress Summary           Patient Acceptance, E,D, VU,NR by  at 9/15/2021 1323    Acceptance, E,D, VU,NR by  at 9/14/2021 1340                   Point: Body  mechanics (Done)     Learning Progress Summary           Patient Acceptance, E,D, VU,NR by  at 9/15/2021 1323    Acceptance, E,D, VU,NR by  at 9/14/2021 1340    Acceptance, E,TB, VU by MT at 9/14/2021 0448                   Point: Precautions (Done)     Learning Progress Summary           Patient Acceptance, E,D, VU,NR by  at 9/15/2021 1323    Acceptance, E,D, VU,NR by  at 9/14/2021 1340    Acceptance, E,TB, VU by MT at 9/14/2021 0448                               User Key     Initials Effective Dates Name Provider Type Discipline    MT 06/16/21 -  Jillian Noel, RN Registered Nurse Nurse     06/16/21 -  Bárbara Camp, PT Physical Therapist PT     06/16/21 -  Bharati Sanchez PTA Physical Therapy Assistant PT              PT Recommendation and Plan     Plan of Care Reviewed With: patient  Progress: no change  Outcome Summary: Pt tolerating PT today after reporting she did not sleep well and in pain after walking 400' in [place] w/ staff last night. Pt agreeable to TE at EOB and supine w/ 10-15 reps each performed. PT will prog as pt bettye.     Time Calculation:   PT Charges     Row Name 09/15/21 1324             Time Calculation    Start Time  1252  -PH      Stop Time  1315  -PH      Time Calculation (min)  23 min  -PH      PT Received On  09/15/21  -PH      PT - Next Appointment  09/16/21  -PH         Timed Charges    08467 - PT Therapeutic Exercise Minutes  23  -PH         Total Minutes    Timed Charges Total Minutes  23  -PH       Total Minutes  23  -PH        User Key  (r) = Recorded By, (t) = Taken By, (c) = Cosigned By    Initials Name Provider Type     Bharati Sanchez PTA Physical Therapy Assistant        Therapy Charges for Today     Code Description Service Date Service Provider Modifiers Qty    46789301803 HC PT THER PROC EA 15 MIN 9/14/2021 Bharati Sanchez PTA GP 1    54630013654 HC PT THERAPEUTIC ACT EA 15 MIN 9/14/2021 Bharati Sanchez PTA GP 1     [id number]  PT THER PROC EA 15 MIN 9/15/2021 Bharati Sanchez, PTA GP 2          PT G-Codes  Outcome Measure Options: AM-PAC 6 Clicks Basic Mobility (PT)  AM-PAC 6 Clicks Score (PT): 17  AM-PAC 6 Clicks Score (OT): 18    Bharati Sanchez PTA  9/15/2021

## 2021-09-16 ENCOUNTER — READMISSION MANAGEMENT (OUTPATIENT)
Dept: CALL CENTER | Facility: HOSPITAL | Age: 81
End: 2021-09-16

## 2021-09-16 VITALS
RESPIRATION RATE: 16 BRPM | OXYGEN SATURATION: 98 % | HEIGHT: 64 IN | HEART RATE: 78 BPM | SYSTOLIC BLOOD PRESSURE: 131 MMHG | DIASTOLIC BLOOD PRESSURE: 70 MMHG | BODY MASS INDEX: 23.9 KG/M2 | WEIGHT: 140 LBS | TEMPERATURE: 97.2 F

## 2021-09-16 LAB
ANION GAP SERPL CALCULATED.3IONS-SCNC: 9.1 MMOL/L (ref 5–15)
BASOPHILS # BLD AUTO: 0.03 10*3/MM3 (ref 0–0.2)
BASOPHILS NFR BLD AUTO: 0.3 % (ref 0–1.5)
BUN SERPL-MCNC: 14 MG/DL (ref 8–23)
BUN/CREAT SERPL: 25.5 (ref 7–25)
CALCIUM SPEC-SCNC: 8.7 MG/DL (ref 8.6–10.5)
CHLORIDE SERPL-SCNC: 108 MMOL/L (ref 98–107)
CO2 SERPL-SCNC: 24.9 MMOL/L (ref 22–29)
CREAT SERPL-MCNC: 0.55 MG/DL (ref 0.57–1)
DEPRECATED RDW RBC AUTO: 39.6 FL (ref 37–54)
EOSINOPHIL # BLD AUTO: 0.06 10*3/MM3 (ref 0–0.4)
EOSINOPHIL NFR BLD AUTO: 0.6 % (ref 0.3–6.2)
ERYTHROCYTE [DISTWIDTH] IN BLOOD BY AUTOMATED COUNT: 12.1 % (ref 12.3–15.4)
GFR SERPL CREATININE-BSD FRML MDRD: 107 ML/MIN/1.73
GLUCOSE SERPL-MCNC: 77 MG/DL (ref 65–99)
HCT VFR BLD AUTO: 32.8 % (ref 34–46.6)
HGB BLD-MCNC: 10.9 G/DL (ref 12–15.9)
IMM GRANULOCYTES # BLD AUTO: 0.09 10*3/MM3 (ref 0–0.05)
IMM GRANULOCYTES NFR BLD AUTO: 1 % (ref 0–0.5)
LYMPHOCYTES # BLD AUTO: 1.42 10*3/MM3 (ref 0.7–3.1)
LYMPHOCYTES NFR BLD AUTO: 15.2 % (ref 19.6–45.3)
MCH RBC QN AUTO: 30 PG (ref 26.6–33)
MCHC RBC AUTO-ENTMCNC: 33.2 G/DL (ref 31.5–35.7)
MCV RBC AUTO: 90.4 FL (ref 79–97)
MONOCYTES # BLD AUTO: 1.15 10*3/MM3 (ref 0.1–0.9)
MONOCYTES NFR BLD AUTO: 12.3 % (ref 5–12)
NEUTROPHILS NFR BLD AUTO: 6.57 10*3/MM3 (ref 1.7–7)
NEUTROPHILS NFR BLD AUTO: 70.6 % (ref 42.7–76)
NRBC BLD AUTO-RTO: 0 /100 WBC (ref 0–0.2)
PHOSPHATE SERPL-MCNC: 2.3 MG/DL (ref 2.5–4.5)
PLATELET # BLD AUTO: 367 10*3/MM3 (ref 140–450)
PMV BLD AUTO: 9.6 FL (ref 6–12)
POTASSIUM SERPL-SCNC: 4.1 MMOL/L (ref 3.5–5.2)
RBC # BLD AUTO: 3.63 10*6/MM3 (ref 3.77–5.28)
SODIUM SERPL-SCNC: 142 MMOL/L (ref 136–145)
WBC # BLD AUTO: 9.32 10*3/MM3 (ref 3.4–10.8)

## 2021-09-16 PROCEDURE — 63710000001 PREDNISONE PER 1 MG: Performed by: STUDENT IN AN ORGANIZED HEALTH CARE EDUCATION/TRAINING PROGRAM

## 2021-09-16 PROCEDURE — 63710000001 PREDNISONE PER 5 MG: Performed by: STUDENT IN AN ORGANIZED HEALTH CARE EDUCATION/TRAINING PROGRAM

## 2021-09-16 PROCEDURE — 84100 ASSAY OF PHOSPHORUS: CPT | Performed by: STUDENT IN AN ORGANIZED HEALTH CARE EDUCATION/TRAINING PROGRAM

## 2021-09-16 PROCEDURE — 97110 THERAPEUTIC EXERCISES: CPT

## 2021-09-16 PROCEDURE — 85025 COMPLETE CBC W/AUTO DIFF WBC: CPT | Performed by: STUDENT IN AN ORGANIZED HEALTH CARE EDUCATION/TRAINING PROGRAM

## 2021-09-16 PROCEDURE — 80048 BASIC METABOLIC PNL TOTAL CA: CPT | Performed by: STUDENT IN AN ORGANIZED HEALTH CARE EDUCATION/TRAINING PROGRAM

## 2021-09-16 RX ORDER — LEVOTHYROXINE SODIUM 0.1 MG/1
100 TABLET ORAL
Qty: 90 TABLET | Refills: 0 | Status: SHIPPED | OUTPATIENT
Start: 2021-09-17

## 2021-09-16 RX ORDER — FLUCONAZOLE 100 MG/1
150 TABLET ORAL DAILY
Qty: 2 TABLET | Refills: 0 | Status: SHIPPED | OUTPATIENT
Start: 2021-09-16 | End: 2021-09-17

## 2021-09-16 RX ORDER — HYDROXYCHLOROQUINE SULFATE 200 MG/1
200 TABLET, FILM COATED ORAL EVERY 12 HOURS SCHEDULED
Status: DISCONTINUED | OUTPATIENT
Start: 2021-09-16 | End: 2021-09-16 | Stop reason: HOSPADM

## 2021-09-16 RX ORDER — CEFDINIR 300 MG/1
300 CAPSULE ORAL 2 TIMES DAILY
Qty: 2 CAPSULE | Refills: 0 | Status: SHIPPED | OUTPATIENT
Start: 2021-09-16 | End: 2021-09-17

## 2021-09-16 RX ORDER — PREDNISONE 10 MG/1
TABLET ORAL
Qty: 13 TABLET | Refills: 0 | Status: SHIPPED | OUTPATIENT
Start: 2021-09-17 | End: 2021-09-26

## 2021-09-16 RX ADMIN — IBUPROFEN 800 MG: 400 TABLET, FILM COATED ORAL at 09:40

## 2021-09-16 RX ADMIN — ASPIRIN 81 MG: 81 TABLET, CHEWABLE ORAL at 09:41

## 2021-09-16 RX ADMIN — NIFEDIPINE 90 MG: 60 TABLET, FILM COATED, EXTENDED RELEASE ORAL at 09:40

## 2021-09-16 RX ADMIN — ACETAMINOPHEN 650 MG: 325 TABLET, FILM COATED ORAL at 05:35

## 2021-09-16 RX ADMIN — ACETAMINOPHEN 650 MG: 325 TABLET, FILM COATED ORAL at 09:40

## 2021-09-16 RX ADMIN — LOSARTAN POTASSIUM 100 MG: 100 TABLET, FILM COATED ORAL at 09:41

## 2021-09-16 RX ADMIN — Medication 2 PACKET: at 11:32

## 2021-09-16 RX ADMIN — PANTOPRAZOLE SODIUM 40 MG: 40 TABLET, DELAYED RELEASE ORAL at 05:35

## 2021-09-16 RX ADMIN — HYDROXYCHLOROQUINE SULFATE 200 MG: 200 TABLET ORAL at 11:32

## 2021-09-16 RX ADMIN — LEVOTHYROXINE SODIUM 100 MCG: 0.1 TABLET ORAL at 05:35

## 2021-09-16 RX ADMIN — PREDNISONE 30 MG: 20 TABLET ORAL at 09:40

## 2021-09-16 NOTE — PLAN OF CARE
Goal Outcome Evaluation:  Plan of Care Reviewed With: patient        Progress: no change  Outcome Summary: Pt c/o increased arthritic pain that is not relieved with prn pain medications, assist x1 with walker, vss, afebrile, received IV antibiotic, reports vaginal itching and dysuria, will continue to monitor.

## 2021-09-16 NOTE — THERAPY TREATMENT NOTE
Patient Name: Patricia Donahue  : 1940    MRN: [id number]                              Today's Date: 2021       Admit Date: 2021    Visit Dx:     ICD-10-CM ICD-9-CM   1. Generalized weakness  R53.1 780.79   2. Unable to ambulate  R26.2 719.7   3. Complicated UTI (urinary tract infection)  N39.0 599.0   4. Age-related physical debility  R54 797     Patient Active Problem List   Diagnosis   • Generalized weakness   • Acute UTI (urinary tract infection)   • Hypertension   • Hypothyroidism   • Carotid artery disease (CMS/[facility])   • History of TIA (transient ischemic attack)   • HLD (hyperlipidemia)   • GERD (gastroesophageal reflux disease)   • Rheumatoid arthritis involving multiple sites (CMS/[facility])     History reviewed. No pertinent past medical history.  History reviewed. No pertinent surgical history.  General Information     [facility] Name 21 1312          Physical Therapy Time and Intention    Document Type  therapy note (daily note)  -     Mode of Treatment  physical therapy  -[facility] Name 21 1312          General Information    Existing Precautions/Restrictions  fall  -SM     Row Name 21 1312          Cognition    Orientation Status (Cognition)  oriented x 4  -       User Key  (r) = Recorded By, (t) = Taken By, (c) = Cosigned By    Initials Name Provider Type     Isabella Rolle PTA Physical Therapy Assistant        Mobility     [facility] Name 21 1312          Bed Mobility    Bed Mobility  supine-sit  -     Supine-Sit [place] (Bed Mobility)  minimum assist (75% patient effort)  -     Assistive Device (Bed Mobility)  bed rails;head of bed elevated  -[facility] Name 21 1312          Sit-Stand Transfer    Sit-Stand [place] (Transfers)  standby assist  -     Assistive Device (Sit-Stand Transfers)  walker, front-wheeled  -     Row Name 21 1312          Gait/Stairs (Locomotion)    [place] Level (Gait)  standby assist;contact guard  -      Assistive Device (Gait)  walker, front-wheeled  -SM     Distance in Feet (Gait)  25  -SM     Deviations/Abnormal Patterns (Gait)  heber decreased;stride length decreased  -SM     Bilateral Gait Deviations  forward flexed posture  -SM       User Key  (r) = Recorded By, (t) = Taken By, (c) = Cosigned By    Initials Name Provider Type    Isabella Richter PTA Physical Therapy Assistant        Obj/Interventions     Row Name 09/16/21 1314          Motor Skills    Therapeutic Exercise  -- seated AP and LAQ x20 reps  -SM       User Key  (r) = Recorded By, (t) = Taken By, (c) = Cosigned By    Initials Name Provider Type    Isabella Richter PTA Physical Therapy Assistant        Goals/Plan    No documentation.       Clinical Impression     Row Name 09/16/21 1314          Pain    Additional Documentation  Pain Scale: Numbers Pre/Post-Treatment (Group)  -SM     Row Name 09/16/21 1314          Pain Scale: Numbers Pre/Post-Treatment    Pretreatment Pain Rating  4/10  -SM     Posttreatment Pain Rating  3/10  -SM     Pain Location - Side  Right  -SM     Pain Location  groin  -SM     Pain Intervention(s)  Repositioned;Ambulation/increased activity;Rest  -SM     Row Name 09/16/21 1314          Positioning and Restraints    Pre-Treatment Position  in bed  -SM     Post Treatment Position  bed  -SM     In Bed  sitting EOB;call light within reach;encouraged to call for assist;with family/caregiver;notified nsg  -SM       User Key  (r) = Recorded By, (t) = Taken By, (c) = Cosigned By    Initials Name Provider Type    Isabella Richter PTA Physical Therapy Assistant        Outcome Measures     Row Name 09/16/21 1315          How much help from another person do you currently need...    Turning from your back to your side while in flat bed without using bedrails?  4  -SM     Moving from lying on back to sitting on the side of a flat bed without bedrails?  3  -SM     Moving to and from a bed to a chair (including a  wheelchair)?  4  -SM     Standing up from a chair using your arms (e.g., wheelchair, bedside chair)?  4  -SM     Climbing 3-5 steps with a railing?  3  -SM     To walk in hospital room?  3  -SM     AM-PAC 6 Clicks Score (PT)  21  -SM     Row Name 09/16/21 1315          Functional Assessment    Outcome Measure Options  AM-PAC 6 Clicks Basic Mobility (PT)  -       User Key  (r) = Recorded By, (t) = Taken By, (c) = Cosigned By    Initials Name Provider Type    Isabella Richter, PTA Physical Therapy Assistant                       Physical Therapy Education                 Title: PT OT SLP Therapies (Done)     Topic: Physical Therapy (Resolved)     Point: Mobility training (Resolved)     Learning Progress Summary           Patient Acceptance, E,TB,D, VU,NR by  at 9/16/2021 1316    Acceptance, E,D, VU,NR by  at 9/15/2021 1323    Acceptance, E,D, VU,NR by  at 9/14/2021 1340    Acceptance, E,TB, VU by MT at 9/14/2021 0448    Acceptance, E,TB,D, VU,NR by  at 9/13/2021 1501                   Point: Home exercise program (Resolved)     Learning Progress Summary           Patient Acceptance, E,TB,D, VU,NR by  at 9/16/2021 1316    Acceptance, E,D, VU,NR by  at 9/15/2021 1323    Acceptance, E,D, VU,NR by  at 9/14/2021 1340                   Point: Body mechanics (Resolved)     Learning Progress Summary           Patient Acceptance, E,TB,D, VU,NR by  at 9/16/2021 1316    Acceptance, E,D, VU,NR by PH at 9/15/2021 1323    Acceptance, E,D, VU,NR by PH at 9/14/2021 1340    Acceptance, E,TB, VU by MT at 9/14/2021 0448                   Point: Precautions (Resolved)     Learning Progress Summary           Patient Acceptance, E,TB,D, VU,NR by  at 9/16/2021 1316    Acceptance, E,D, VU,NR by PH at 9/15/2021 1323    Acceptance, E,D, VU,NR by  at 9/14/2021 1340    Acceptance, E,TB, VU by MT at 9/14/2021 9337                               User Key     Initials Effective Dates Name Provider Type Discipline    MT  06/16/21 -  Jillian Noel, RN Registered Nurse Nurse     06/16/21 -  Bárbara Camp PT Physical Therapist PT     03/07/18 -  Isabella Rolle PTA Physical Therapy Assistant PT     06/16/21 -  Bharati Sanchez PTA Physical Therapy Assistant PT              PT Recommendation and Plan     Plan of Care Reviewed With: patient  Progress: improving  Outcome Summary: Pt tolerated treatment well this date. Increased gait distance to 25ft, only limited d/t pt wanting to get dressed. Required Rw and CGA-SBA, slow pace d/t R groin pain. Improved mobility and pain once ambulating. Discussed w/ pt on d/c home, and she feels very comfortable going home w/ some assist from her children and HH. Also discussed w/ pt's son, and all questions and concerns were addressed. Pt is safe to d/c home w/ HH from PT standpoint.     Time Calculation:   PT Charges     Row Name 09/16/21 1320             Time Calculation    Start Time  1105  -      Stop Time  1130  -      Time Calculation (min)  25 min  -      PT Received On  09/16/21  -      PT - Next Appointment  09/17/21  -        User Key  (r) = Recorded By, (t) = Taken By, (c) = Cosigned By    Initials Name Provider Type     Isabella Rolle PTA Physical Therapy Assistant        Therapy Charges for Today     Code Description Service Date Service Provider Modifiers Qty    30140096960 HC PT THER PROC EA 15 MIN 9/16/2021 Isabella Rolle PTA GP 2          PT G-Codes  Outcome Measure Options: AM-PAC 6 Clicks Basic Mobility (PT)  AM-PAC 6 Clicks Score (PT): 21  AM-PAC 6 Clicks Score (OT): 18    Isabella Rolle PTA  9/16/2021

## 2021-09-16 NOTE — PLAN OF CARE
Goal Outcome Evaluation:  Plan of Care Reviewed With: patient        Progress: improving  Outcome Summary: Pt tolerated treatment well this date. Increased gait distance to 25ft, only limited d/t pt wanting to get dressed. Required Rw and CGA-SBA, slow pace d/t R groin pain. Improved mobility and pain once ambulating. Discussed w/ pt on d/c home, and she feels very comfortable going home w/ some assist from her children and HH. Also discussed w/ pt's son, and all questions and concerns were addressed. Pt is safe to d/c home w/ HH from PT standpoint.

## 2021-09-16 NOTE — DISCHARGE SUMMARY
Patient Name: Patricia Donahue  : 1940  MRN: [id number]    Date of Admission: 2021  Date of Discharge:  2021  Primary Care Physician: Provider, No Known      Chief Complaint:   Pain and Weakness - Generalized      Discharge Diagnoses     Active Hospital Problems    Diagnosis  POA   • **Acute UTI (urinary tract infection) [N39.0]  Unknown   • Hypertension [I10]  Unknown   • Hypothyroidism [E03.9]  Unknown   • Carotid artery disease (CMS/[facility]) [I77.9]  Unknown   • History of TIA (transient ischemic attack) [Z86.73]  Not Applicable   • HLD (hyperlipidemia) [E78.5]  Unknown   • GERD (gastroesophageal reflux disease) [K21.9]  Unknown   • Rheumatoid arthritis involving multiple sites (CMS/[facility]) [M06.9]  Unknown   • Generalized weakness [R53.1]  Yes      Resolved Hospital Problems   No resolved problems to display.        Hospital Course     Ms. Donahue is a 80 y.o. female with a history of rheumatoid arthritis, hypertension, TIA who presented to  initially complaining of generalized malaise and LUTS.  Please see the admitting history and physical for further details.  She was found to have urinary tract infection and was admitted to the hospital for further evaluation and treatment.  Patient started on Rocephin while in the ED which was continued throughout her admission, her lower urinary tract symptoms improved and she was transitioned to p.o. antibiotics prior to discharge to complete a 5-day course.  Hospital course was complicated by generalized pain thought to be secondary to patient's rheumatoid arthritis.  She was started on prednisone 30 mg daily and will be sent home on a prednisone taper.  Patient has been instructed to follow-up with her outpatient rheumatologist Dr. Ott.  Patient worked with physical therapy while admitted and qualified for home health PT.    Day of Discharge     Subjective:  Patient states she feels ready to go home today she says she has been  miserable in the hospital.  She is looking forward to getting home, son is at bedside and voices concerns about patient's mobility.  Physical therapy to work with patient today to determine physical therapy needs.    Review of Systems  As above  Physical Exam:  Temp:  [97 °F (36.1 °C)-98.3 °F (36.8 °C)] 97.2 °F (36.2 °C)  Heart Rate:  [77-82] 78  Resp:  [16] 16  BP: (121-159)/(70-82) 131/70  Body mass index is 24.02 kg/m².  Physical Exam  Constitutional:       General: She is not in acute distress.     Appearance: Normal appearance. She is normal weight. She is not ill-appearing.   HENT:      Head: Normocephalic and atraumatic.      Nose: Nose normal.      Mouth/Throat:      Mouth: Mucous membranes are moist.      Pharynx: Oropharynx is clear.   Eyes:      Extraocular Movements: Extraocular movements intact.      Conjunctiva/sclera: Conjunctivae normal.      Pupils: Pupils are equal, round, and reactive to light.   Neck:      Comments: Chronic limited neck range of motion  Cardiovascular:      Rate and Rhythm: Normal rate and regular rhythm.      Pulses: Normal pulses.   Pulmonary:      Effort: Pulmonary effort is normal. No respiratory distress.      Breath sounds: Normal breath sounds. No wheezing.   Abdominal:      General: Abdomen is flat. Bowel sounds are normal. There is no distension.      Palpations: Abdomen is soft.   Musculoskeletal:         General: No swelling or tenderness. Normal range of motion.      Cervical back: Neck supple.      Right lower leg: No edema.      Left lower leg: No edema.   Skin:     General: Skin is warm and dry.   Neurological:      General: No focal deficit present.      Mental Status: She is alert and oriented to person, place, and time. Mental status is at baseline.   Psychiatric:         Mood and Affect: Mood normal.         Behavior: Behavior normal.         Thought Content: Thought content normal.         Judgment: Judgment normal.         Consultants     Consult Orders (all)  (From admission, onward)     Start     Ordered    09/14/21 1154  Inpatient Nutrition Consult  Once     Provider:  (Not yet assigned)    09/14/21 1155    09/14/21 0800  Inpatient Case Management  Consult  Once     Provider:  (Not yet assigned)    09/14/21 0548    09/12/21 2212  [facility] (on-call MD unless specified) Details  Once     Specialty:  Hospitalist  Provider:  (Not yet assigned)    09/12/21 2211              Procedures     Imaging Results (All)     Procedure Component Value Units Date/Time    XR Chest 1 View [[id number]] Collected: 09/12/21 1807     Updated: 09/12/21 1909    Narrative:      PORTABLE CHEST     HISTORY:  Weakness, dizziness.     COMPARISON: None.     A single view of the chest demonstrates the heart to be within normal  limits in size. There is no evidence of consolidation, effusion or  congestive failure. Minimal linear atelectasis/scarring is noted  involving the lung bases bilaterally.      This report was finalized on 9/12/2021 7:06 PM by Dr. Esdras Sellers M.D.             Pertinent Labs     Results from last 7 days   Lab Units 09/16/21  0623 09/15/21  0734 09/14/21  0853 09/13/21  0806   WBC 10*3/mm3 9.32 10.02 9.24 9.27   HEMOGLOBIN g/dL 10.9* 10.5* 11.0* 10.4*   PLATELETS 10*3/mm3 367 328 329 278     Results from last 7 days   Lab Units 09/16/21  0623 09/15/21  0734 09/14/21  0853 09/13/21  0806   SODIUM mmol/L 142 141 136 135*   POTASSIUM mmol/L 4.1 3.4* 3.6 3.8   CHLORIDE mmol/L 108* 106 101 102   CO2 mmol/L 24.9 24.3 22.6 23.1   BUN mg/dL 14 14 18 12   CREATININE mg/dL 0.55* 0.52* 0.59 0.61   GLUCOSE mg/dL 77 75 156* 97   Estimated Creatinine Clearance: 58.1 mL/min (A) (by C-G formula based on SCr of 0.55 mg/dL (L)).  Results from last 7 days   Lab Units 09/12/21 1901   ALBUMIN g/dL 4.00   BILIRUBIN mg/dL 0.2   ALK PHOS U/L 85   AST (SGOT) U/L 14   ALT (SGPT) U/L 14     Results from last 7 days   Lab Units 09/16/21  0623 09/15/21  2155 09/15/21  0734 09/14/21  0853  09/13/21  0806 09/12/21 1901 09/12/21 1901   CALCIUM mg/dL 8.7  --  8.4* 9.1 8.7   < > 9.5   ALBUMIN g/dL  --   --   --   --   --   --  4.00   MAGNESIUM mg/dL  --   --  2.2 2.3  --   --  2.1   PHOSPHORUS mg/dL 2.3* 2.3* 2.3* 3.5  --   --   --     < > = values in this interval not displayed.       Results from last 7 days   Lab Units 09/12/21 1901   CK TOTAL U/L 59   TROPONIN T ng/mL <0.010           Invalid input(s): LDLCALC  Results from last 7 days   Lab Units 09/12/21  2341 09/12/21  2334   BLOODCX  No growth at 3 days No growth at 3 days       Test Results Pending at Discharge     Pending Labs     Order Current Status    Blood Culture - Blood, Arm, Left Preliminary result    Blood Culture - Blood, Arm, Right Preliminary result          Discharge Details        Discharge Medications      New Medications      Instructions Start Date   cefdinir 300 MG capsule  Commonly known as: OMNICEF   300 mg, Oral, 2 Times Daily      fluconazole 100 MG tablet  Commonly known as: Diflucan   150 mg, Oral, Daily      predniSONE 10 MG tablet  Commonly known as: DELTASONE   Take 2 tablets by mouth Daily for 3 days, THEN 1 tablet Daily for 3 days, THEN 0.5 tablets Daily for 3 days.   Start Date: September 17, 2021        Changes to Medications      Instructions Start Date   levothyroxine 100 MCG tablet  Commonly known as: SYNTHROID, LEVOTHROID  What changed:   · medication strength  · how much to take  · when to take this   100 mcg, Oral, Every Early Morning   Start Date: September 17, 2021        Continue These Medications      Instructions Start Date   Aspirin 81 MG capsule   81 mg, Oral, Daily      calcium citrate-vitamin d 315-250 MG-UNIT tablet tablet  Commonly known as: CALCITRATE   1 tablet, Oral, 2 times daily      diphenhydrAMINE 25 mg capsule  Commonly known as: BENADRYL   25 mg, Oral, Nightly      esomeprazole 40 MG capsule  Commonly known as: nexIUM   40 mg, Oral, 2 Times Daily      HYDROXYCHLOROQUINE SULFATE PO   200  mg, Oral, 2 times daily      ibuprofen 800 MG tablet  Commonly known as: ADVIL,MOTRIN   800 mg, Oral, 2 Times Daily      NIFEdipine XL 90 MG 24 hr tablet  Commonly known as: PROCARDIA XL   90 mg, Oral, Daily      telmisartan 80 MG tablet  Commonly known as: MICARDIS   80 mg, Oral, Daily             No Known Allergies      Discharge Disposition:  Home or Self Care    Discharge Diet:  Diet Order   Procedures   • Diet Regular       Discharge Activity:   Activity Instructions     Activity as Tolerated            CODE STATUS:    Code Status and Medical Interventions:   Ordered at: 09/13/21 0219     Code Status:    CPR     Medical Interventions (Level of Support Prior to Arrest):    Full       No future appointments.  Additional Instructions for the Follow-ups that You Need to Schedule     Ambulatory Referral to Home Health   As directed      Face to Face Visit Date: 9/16/2021    Follow-up provider for Plan of Care?: I treated the patient in an acute care facility and will not continue treatment after discharge.    Follow-up provider: MAEVE RICK [[id number]]    Reason/Clinical Findings: Weakness, ,impaired mobility    Describe mobility limitations that make leaving home difficult: Immobility    Nursing/Therapeutic Services Requested: Physical Therapy    Frequency: Other         Discharge Follow-up with PCP   As directed       Currently Documented PCP:    Provider, No Known    PCP Phone Number:    [phone]     Follow Up Details: Increased dose of Synthroid due to low TSH. Follow up with PCP for repeat TSH in 6 weeks.         Discharge Follow-up with Specified Provider: Follow up with your rheumatologist in 1-2 weeks.   As directed      To: Follow up with your rheumatologist in 1-2 weeks.           Follow-up Information     Provider, No Known .    Why: Increased dose of Synthroid due to low TSH. Follow up with PCP for repeat TSH in 6 weeks.  Contact information:  [facility]  [address]  [phone]                   Additional Instructions for the Follow-ups that You Need to Schedule     Ambulatory Referral to Home Health   As directed      Face to Face Visit Date: 9/16/2021    Follow-up provider for Plan of Care?: I treated the patient in an acute care facility and will not continue treatment after discharge.    Follow-up provider: MAEVE RICK [[id number]]    Reason/Clinical Findings: Weakness, ,impaired mobility    Describe mobility limitations that make leaving home difficult: Immobility    Nursing/Therapeutic Services Requested: Physical Therapy    Frequency: Other         Discharge Follow-up with PCP   As directed       Currently Documented PCP:    Provider, No Known    PCP Phone Number:    [phone]     Follow Up Details: Increased dose of Synthroid due to low TSH. Follow up with PCP for repeat TSH in 6 weeks.         Discharge Follow-up with Specified Provider: Follow up with your rheumatologist in 1-2 weeks.   As directed      To: Follow up with your rheumatologist in 1-2 weeks.           Time Spent on Discharge:  Greater than 30 minutes      Babs La MD  [facility]  09/16/21  13:18 EDT

## 2021-09-17 LAB
BACTERIA SPEC AEROBE CULT: NORMAL
BACTERIA SPEC AEROBE CULT: NORMAL

## 2021-09-17 NOTE — PROGRESS NOTES
Case Management Discharge Note      Final Note: Discharged home with [facility]. Informed Lisa with [facility].  Lulu Palmer, RN    Provided Post Acute Provider List?: N/A  N/A Provider List Comment: Requested [facility]        Home Medical Care     Service Provider Selected Services Address Phone Fax Patient Preferred    [facility]  Home Health Services [address] [address] [phone] --         Transportation Services  Private: Car    Final Discharge Disposition Code: 06 - home with home health care

## 2021-09-17 NOTE — OUTREACH NOTE
Prep Survey      Responses   [facility] discharged from?  [place]   Is LACE score < 7 ?  No   Emergency Room discharge w/ pulse ox?  No   Eligibility  Readm Mgmt   Discharge diagnosis  Acute UTI   Does the patient have one of the following disease processes/diagnoses(primary or secondary)?  Other   Does the patient have Home health ordered?  Yes   What is the Home health agency?   [facility]    Is there a DME ordered?  No   Prep survey completed?  Yes          SHAMEKA Hallman RN

## 2021-09-21 ENCOUNTER — READMISSION MANAGEMENT (OUTPATIENT)
Dept: CALL CENTER | Facility: HOSPITAL | Age: 81
End: 2021-09-21

## 2021-09-21 NOTE — OUTREACH NOTE
Medical Week 1 Survey      Responses   [facility] patient discharged from?  [place]   Does the patient have one of the following disease processes/diagnoses(primary or secondary)?  Other   Week 1 attempt successful?  Yes   Call start time  1437   Call end time  1440   Discharge diagnosis  Acute UTI   Meds reviewed with patient/caregiver?  Yes   Is the patient having any side effects they believe may be caused by any medication additions or changes?  No   Does the patient have all medications ordered at discharge?  Yes   Is the patient taking all medications as directed (includes completed medication regime)?  Yes   Does the patient have a primary care provider?   Yes   Does the patient have an appointment with their PCP within 7 days of discharge?  No   Comments regarding PCP  PCP was updated in Epic.    What is preventing the patient from scheduling follow up appointments within 7 days of discharge?  -- [unsure]   Nursing Interventions  Advised patient to make appointment   Has the patient kept scheduled appointments due by today?  N/A   What is the Home health agency?   [facility]    Has home health visited the patient within 72 hours of discharge?  Unsure   Psychosocial issues?  No   Did the patient receive a copy of their discharge instructions?  Yes   Nursing interventions  Reviewed instructions with patient   What is the patient's perception of their health status since discharge?  Same [Pt states has burning during urination]   Is the patient/caregiver able to teach back signs and symptoms related to disease process for when to call PCP?  Yes   Is the patient/caregiver able to teach back signs and symptoms related to disease process for when to call 911?  Yes   Is the patient/caregiver able to teach back the hierarchy of who to call/visit for symptoms/problems? PCP, Specialist, Home health nurse, Urgent Care, ED, 911  Yes   If the patient is a current smoker, are they able to teach  back resources for cessation?  Not a smoker   Week 1 call completed?  Yes          Trang Benavides RN

## 2021-09-29 ENCOUNTER — READMISSION MANAGEMENT (OUTPATIENT)
Dept: CALL CENTER | Facility: HOSPITAL | Age: 81
End: 2021-09-29

## 2021-09-29 NOTE — OUTREACH NOTE
Medical Week 2 Survey      Responses   [facility] patient discharged from?  [place]   Does the patient have one of the following disease processes/diagnoses(primary or secondary)?  Other   Week 2 attempt successful?  Yes   Call start time  1236   Discharge diagnosis  Acute UTI   Call end time  1239   Meds reviewed with patient/caregiver?  Yes   Is the patient having any side effects they believe may be caused by any medication additions or changes?  No   Does the patient have all medications ordered at discharge?  Yes   Is the patient taking all medications as directed (includes completed medication regime)?  Yes   Does the patient have a primary care provider?   Yes   Does the patient have an appointment with their PCP within 7 days of discharge?  No   Comments regarding PCP  Ladarius Crockett MD   What is preventing the patient from scheduling follow up appointments within 7 days of discharge?  Haven't had time   Nursing Interventions  Advised patient to make appointment, Educated patient on importance of making appointment   Has the patient kept scheduled appointments due by today?  N/A   What is the Home health agency?   [facility]    Has home health visited the patient within 72 hours of discharge?  Yes   Home health comments  HH is for PT only   Psychosocial issues?  No   Did the patient receive a copy of their discharge instructions?  Yes   Nursing interventions  Reviewed instructions with patient   What is the patient's perception of their health status since discharge?  Same   Is the patient/caregiver able to teach back signs and symptoms related to disease process for when to call PCP?  Yes   Is the patient/caregiver able to teach back signs and symptoms related to disease process for when to call 911?  Yes   Is the patient/caregiver able to teach back the hierarchy of who to call/visit for symptoms/problems? PCP, Specialist, Home health nurse, Urgent Care, ED, 911  Yes   If the  patient is a current smoker, are they able to teach back resources for cessation?  Not a smoker   Additional teach back comments  Patient still has burning upron urination and she has completed her antibiotics. Encouraged her to be seen asap with PCP. She denies a fever. She verbalized understanding and stated she would make appt.    Week 2 Call Completed?  Yes          Hakan Felipe RN

## 2021-10-07 ENCOUNTER — READMISSION MANAGEMENT (OUTPATIENT)
Dept: CALL CENTER | Facility: HOSPITAL | Age: 81
End: 2021-10-07

## 2021-10-07 NOTE — OUTREACH NOTE
Medical Week 3 Survey      Responses   [facility] patient discharged from?  [place]   Does the patient have one of the following disease processes/diagnoses(primary or secondary)?  Other   Week 3 attempt successful?  Yes   Call start time  1514   Call end time  1518   Discharge diagnosis  Acute UTI   Meds reviewed with patient/caregiver?  Yes   Is the patient taking all medications as directed (includes completed medication regime)?  Yes   Comments regarding appointments  DID get get more ABT for UTI and completed.    Does the patient have a primary care provider?   Yes   Comments regarding PCP  TELE visit per Pt- completed   Has the patient kept scheduled appointments due by today?  Yes   What is the Home health agency?   [facility]    Has home health visited the patient within 72 hours of discharge?  Yes   Home health comments  PT coming today . Pt is using a walker.    Psychosocial issues?  No   Did the patient receive a copy of their discharge instructions?  Yes   Nursing interventions  Reviewed instructions with patient   What is the patient's perception of their health status since discharge?  Same   Is the patient/caregiver able to teach back signs and symptoms related to disease process for when to call PCP?  Yes   Is the patient/caregiver able to teach back signs and symptoms related to disease process for when to call 911?  Yes   Is the patient/caregiver able to teach back the hierarchy of who to call/visit for symptoms/problems? PCP, Specialist, Home health nurse, Urgent Care, ED, 911  Yes   Week 3 Call Completed?  Yes   Wrap up additional comments  Pt states that therapy is coming in , however she is still stiff and has difficulty getting around. Pt reports that she is using a walker. Pt states she will see a RA  in [place] when she can get around a little better.           Kari Canales RN

## 2021-10-14 ENCOUNTER — HOSPITAL ENCOUNTER (OUTPATIENT)
Dept: GENERAL RADIOLOGY | Facility: HOSPITAL | Age: 81
Discharge: HOME | End: 2021-10-14

## 2021-10-14 ENCOUNTER — LAB (OUTPATIENT)
Dept: LAB | Facility: HOSPITAL | Age: 81
End: 2021-10-14

## 2021-10-14 ENCOUNTER — TRANSCRIBE ORDERS (OUTPATIENT)
Dept: ADMINISTRATIVE | Facility: HOSPITAL | Age: 81
End: 2021-10-14

## 2021-10-14 DIAGNOSIS — R53.1 ASTHENIA: ICD-10-CM

## 2021-10-14 DIAGNOSIS — R70.0 ELEVATED SEDIMENTATION RATE: ICD-10-CM

## 2021-10-14 DIAGNOSIS — M54.50 LOW BACK PAIN, UNSPECIFIED BACK PAIN LATERALITY, UNSPECIFIED CHRONICITY, UNSPECIFIED WHETHER SCIATICA PRESENT: ICD-10-CM

## 2021-10-14 DIAGNOSIS — R79.82 ELEVATED C-REACTIVE PROTEIN (CRP): ICD-10-CM

## 2021-10-14 DIAGNOSIS — M54.2 CERVICALGIA: Primary | ICD-10-CM

## 2021-10-14 DIAGNOSIS — M13.0 POLYARTHROPATHY: ICD-10-CM

## 2021-10-14 DIAGNOSIS — M25.551 RIGHT HIP PAIN: ICD-10-CM

## 2021-10-14 DIAGNOSIS — M54.2 CERVICALGIA: ICD-10-CM

## 2021-10-14 LAB
ALBUMIN SERPL-MCNC: 3.3 G/DL (ref 3.5–5.2)
ALBUMIN/GLOB SERPL: 1.1 G/DL
ALP SERPL-CCNC: 90 U/L (ref 39–117)
ALT SERPL W P-5'-P-CCNC: 14 U/L (ref 1–33)
ANION GAP SERPL CALCULATED.3IONS-SCNC: 14.1 MMOL/L (ref 5–15)
AST SERPL-CCNC: 14 U/L (ref 1–32)
BASOPHILS # BLD AUTO: 0.02 10*3/MM3 (ref 0–0.2)
BASOPHILS NFR BLD AUTO: 0.3 % (ref 0–1.5)
BILIRUB SERPL-MCNC: 0.2 MG/DL (ref 0–1.2)
BUN SERPL-MCNC: 10 MG/DL (ref 8–23)
BUN/CREAT SERPL: 16.4 (ref 7–25)
CALCIUM SPEC-SCNC: 9.4 MG/DL (ref 8.6–10.5)
CHLORIDE SERPL-SCNC: 103 MMOL/L (ref 98–107)
CHROMATIN AB SERPL-ACNC: 15.8 IU/ML (ref 0–14)
CK SERPL-CCNC: 68 U/L (ref 20–180)
CO2 SERPL-SCNC: 23.9 MMOL/L (ref 22–29)
CREAT SERPL-MCNC: 0.61 MG/DL (ref 0.57–1)
CRP SERPL-MCNC: 15.26 MG/DL (ref 0–0.5)
DEPRECATED RDW RBC AUTO: 41.5 FL (ref 37–54)
EOSINOPHIL # BLD AUTO: 0 10*3/MM3 (ref 0–0.4)
EOSINOPHIL NFR BLD AUTO: 0 % (ref 0.3–6.2)
ERYTHROCYTE [DISTWIDTH] IN BLOOD BY AUTOMATED COUNT: 12.3 % (ref 12.3–15.4)
ERYTHROCYTE [SEDIMENTATION RATE] IN BLOOD: 64 MM/HR (ref 0–30)
GFR SERPL CREATININE-BSD FRML MDRD: 95 ML/MIN/1.73
GLOBULIN UR ELPH-MCNC: 3.1 GM/DL
GLUCOSE SERPL-MCNC: 100 MG/DL (ref 65–99)
HCT VFR BLD AUTO: 29.7 % (ref 34–46.6)
HGB BLD-MCNC: 9.2 G/DL (ref 12–15.9)
IMM GRANULOCYTES # BLD AUTO: 0.04 10*3/MM3 (ref 0–0.05)
IMM GRANULOCYTES NFR BLD AUTO: 0.7 % (ref 0–0.5)
LYMPHOCYTES # BLD AUTO: 0.53 10*3/MM3 (ref 0.7–3.1)
LYMPHOCYTES NFR BLD AUTO: 9 % (ref 19.6–45.3)
MCH RBC QN AUTO: 28.5 PG (ref 26.6–33)
MCHC RBC AUTO-ENTMCNC: 31 G/DL (ref 31.5–35.7)
MCV RBC AUTO: 92 FL (ref 79–97)
MONOCYTES # BLD AUTO: 0.52 10*3/MM3 (ref 0.1–0.9)
MONOCYTES NFR BLD AUTO: 8.8 % (ref 5–12)
NEUTROPHILS NFR BLD AUTO: 4.8 10*3/MM3 (ref 1.7–7)
NEUTROPHILS NFR BLD AUTO: 81.2 % (ref 42.7–76)
NRBC BLD AUTO-RTO: 0 /100 WBC (ref 0–0.2)
PLATELET # BLD AUTO: 517 10*3/MM3 (ref 140–450)
PMV BLD AUTO: 9.3 FL (ref 6–12)
POTASSIUM SERPL-SCNC: 4 MMOL/L (ref 3.5–5.2)
PROT SERPL-MCNC: 6.4 G/DL (ref 6–8.5)
RBC # BLD AUTO: 3.23 10*6/MM3 (ref 3.77–5.28)
SODIUM SERPL-SCNC: 141 MMOL/L (ref 136–145)
WBC # BLD AUTO: 5.91 10*3/MM3 (ref 3.4–10.8)

## 2021-10-14 PROCEDURE — 82085 ASSAY OF ALDOLASE: CPT

## 2021-10-14 PROCEDURE — 85025 COMPLETE CBC W/AUTO DIFF WBC: CPT

## 2021-10-14 PROCEDURE — 80053 COMPREHEN METABOLIC PANEL: CPT

## 2021-10-14 PROCEDURE — 82550 ASSAY OF CK (CPK): CPT

## 2021-10-14 PROCEDURE — 85652 RBC SED RATE AUTOMATED: CPT

## 2021-10-14 PROCEDURE — 86431 RHEUMATOID FACTOR QUANT: CPT

## 2021-10-14 PROCEDURE — 73502 X-RAY EXAM HIP UNI 2-3 VIEWS: CPT

## 2021-10-14 PROCEDURE — 72110 X-RAY EXAM L-2 SPINE 4/>VWS: CPT

## 2021-10-14 PROCEDURE — 36415 COLL VENOUS BLD VENIPUNCTURE: CPT

## 2021-10-14 PROCEDURE — 86200 CCP ANTIBODY: CPT

## 2021-10-14 PROCEDURE — 72050 X-RAY EXAM NECK SPINE 4/5VWS: CPT

## 2021-10-14 PROCEDURE — 86140 C-REACTIVE PROTEIN: CPT

## 2021-10-15 ENCOUNTER — READMISSION MANAGEMENT (OUTPATIENT)
Dept: CALL CENTER | Facility: HOSPITAL | Age: 81
End: 2021-10-15

## 2021-10-15 LAB — ALDOLASE SERPL-CCNC: 5.7 U/L (ref 3.3–10.3)

## 2021-10-15 NOTE — OUTREACH NOTE
Medical Week 4 Survey      Responses   [facility] patient discharged from? [place]   Does the patient have one of the following disease processes/diagnoses(primary or secondary)? Other   Week 4 attempt successful? Yes   Call start time 1624   Call end time 1627   Discharge diagnosis Acute UTI   Meds reviewed with patient/caregiver? Yes   Is the patient having any side effects they believe may be caused by any medication additions or changes? No   Is the patient taking all medications as directed (includes completed medication regime)? Yes   Has the patient kept scheduled appointments due by today? Yes   Is the patient still receiving Home Health Services? Yes   Psychosocial issues? No   What is the patient's perception of their health status since discharge? Improving   Is the patient/caregiver able to teach back signs and symptoms related to disease process for when to call PCP? Yes   Is the patient/caregiver able to teach back signs and symptoms related to disease process for when to call 911? Yes   Is the patient/caregiver able to teach back the hierarchy of who to call/visit for symptoms/problems? PCP, Specialist, Home health nurse, Urgent Care, ED, 911 Yes   If the patient is a current smoker, are they able to teach back resources for cessation? Not a smoker   Additional teach back comments No further burning upon urination. She is doing better. Has seen her RA doctor. Still having stiffness with ambulation.    Week 4 Call Completed? Yes   Would the patient like one additional call? No   Graduated Yes   Is the patient interested in additional calls from an ambulatory ?  NOTE:  applies to high risk patients requiring additional follow-up. No   Did the patient feel the follow up calls were helpful during their recovery period? Yes   Was the number of calls appropriate? Yes          Hakan Felipe RN

## 2021-10-16 LAB — CCP IGA+IGG SERPL IA-ACNC: 6 UNITS (ref 0–19)

## 2021-12-15 ENCOUNTER — TRANSCRIBE ORDERS (OUTPATIENT)
Dept: ADMINISTRATIVE | Facility: HOSPITAL | Age: 81
End: 2021-12-15

## 2021-12-15 ENCOUNTER — HOSPITAL ENCOUNTER (OUTPATIENT)
Dept: GENERAL RADIOLOGY | Facility: HOSPITAL | Age: 81
Discharge: HOME | End: 2021-12-15
Admitting: INTERNAL MEDICINE

## 2021-12-15 DIAGNOSIS — R70.0 ELEVATED SEDIMENTATION RATE: ICD-10-CM

## 2021-12-15 DIAGNOSIS — R70.0 ELEVATED SEDIMENTATION RATE: Primary | ICD-10-CM

## 2021-12-15 DIAGNOSIS — R06.02 SHORTNESS OF BREATH: ICD-10-CM

## 2021-12-15 DIAGNOSIS — R79.82 ELEVATED C-REACTIVE PROTEIN (CRP): ICD-10-CM

## 2021-12-15 PROCEDURE — 71046 X-RAY EXAM CHEST 2 VIEWS: CPT

## 2021-12-17 ENCOUNTER — TRANSCRIBE ORDERS (OUTPATIENT)
Dept: ADMINISTRATIVE | Facility: HOSPITAL | Age: 81
End: 2021-12-17

## 2021-12-17 DIAGNOSIS — R79.82 ELEVATED C-REACTIVE PROTEIN (CRP): ICD-10-CM

## 2021-12-17 DIAGNOSIS — M54.2 NECK PAIN: Primary | ICD-10-CM

## 2021-12-17 DIAGNOSIS — M13.0 POLYARTHRITIS: ICD-10-CM

## 2021-12-22 ENCOUNTER — HOSPITAL ENCOUNTER (OUTPATIENT)
Dept: CT IMAGING | Facility: HOSPITAL | Age: 81
Discharge: HOME | End: 2021-12-22
Admitting: INTERNAL MEDICINE

## 2021-12-22 DIAGNOSIS — R79.82 ELEVATED C-REACTIVE PROTEIN (CRP): ICD-10-CM

## 2021-12-22 DIAGNOSIS — M54.2 NECK PAIN: ICD-10-CM

## 2021-12-22 DIAGNOSIS — M13.0 POLYARTHRITIS: ICD-10-CM

## 2021-12-22 PROCEDURE — 72125 CT NECK SPINE W/O DYE: CPT

## 2022-01-17 NOTE — PROGRESS NOTES
"Subjective   Patient ID: Patricia Donahue is a 81 y.o. female is being seen for consultation today at the request of Char Ott, * for neck pain.  Notes from today's visit will be forwarded upon completion.  She is known to have autoimmune disease but unsure which one, still working with PCP on this. She reports neck pain onset 2 years ago with the first autoimmune episode. She reports at that time there was some stiffness. She reports now with the neck pain she is unable to lift her left arm without pulling it up with her right arm. She reports intermittent numbness and tingling of her arms bilaterally. She reports sometimes the pain will go up into her head causing headaches. She reports she has been to physical therapy for her neck with both episodes. She reports now she is doing home exercises from the physical therapist. She reports she is taking 800 mg ibuprofen BID.     History of Present Illness     Ms. Donahue has been having neck pain, cervical occipital pain over the last 2 years.  She reports it is progressively gotten worse a few months ago.   The patient had worsening complaints of neck pain and weakness in her left arm to the point that she is unable to lift it comfortably. She has been working with Dr. Ott and has finally been able to determine that she has a \"autoimmune condition.\"  It appears as if she has rheumatoid arthritis.  CT imaging of the cervical spine revealed some concerning findings for which the patient was referred for neurosurgical opinion.    The following portions of the patient's history were reviewed and updated as appropriate: allergies, current medications, past family history, past medical history, past social history, past surgical history and problem list.    Review of Systems   Constitutional: Positive for activity change.   HENT: Negative for congestion.    Eyes: Negative for visual disturbance.   Respiratory: Negative for chest tightness and shortness of breath.  " "  Cardiovascular: Negative for chest pain.   Gastrointestinal: Negative for nausea and vomiting.   Endocrine: Negative for cold intolerance and heat intolerance.   Genitourinary: Negative for difficulty urinating.   Musculoskeletal: Positive for arthralgias and neck pain. Negative for neck stiffness.   Skin: Negative for rash and wound.   Allergic/Immunologic: Negative for environmental allergies.   Neurological: Positive for numbness and headaches. Negative for weakness.   Hematological: Bruises/bleeds easily.   Psychiatric/Behavioral: Positive for sleep disturbance.     Objective     Vitals:    01/18/22 1249   BP: 114/66   Pulse: 97   Temp: 98.2 °F (36.8 °C)   SpO2: 97%   Weight: 63.5 kg (140 lb)   Height: 162.6 cm (64\")     Body mass index is 24.03 kg/m².  Patient's Body mass index is 24.03 kg/m². indicating that she is within normal range (BMI 18.5-24.9). No BMI management plan needed.      Physical Exam  Vitals reviewed.   Constitutional:       General: She is not in acute distress.     Appearance: Normal appearance. She is well-developed. She is not ill-appearing, toxic-appearing or diaphoretic.   HENT:      Head: Normocephalic and atraumatic.      Nose: Nose normal.      Mouth/Throat:      Mouth: Mucous membranes are moist.   Eyes:      General:         Right eye: No discharge.         Left eye: No discharge.      Conjunctiva/sclera: Conjunctivae normal.   Neck:      Trachea: No tracheal deviation.   Cardiovascular:      Rate and Rhythm: Normal rate.   Pulmonary:      Effort: Pulmonary effort is normal. No respiratory distress.   Abdominal:      General: There is no distension.      Palpations: Abdomen is soft.      Tenderness: There is no abdominal tenderness.   Musculoskeletal:         General: Tenderness present. No deformity. Normal range of motion.      Cervical back: Normal range of motion and neck supple. Tenderness present.      Comments: Cervical occipital tenderness and bilateral trapezius " tenderness to palpation.   Skin:     General: Skin is warm and dry.      Findings: No erythema.   Neurological:      Mental Status: She is alert and oriented to person, place, and time.      GCS: GCS eye subscore is 4. GCS verbal subscore is 5. GCS motor subscore is 6.      Sensory: No sensory deficit.      Motor: Weakness present. No abnormal muscle tone.      Coordination: Coordination normal.      Gait: Gait normal.      Deep Tendon Reflexes: Reflexes are normal and symmetric. Reflexes normal.      Comments: Other than left deltoid 4/5; no other motor deficits on exam.  Sensation is normal in the upper and lower extremities bilaterally.  No motor or sensory deficits. DTR's normal. Negative Calero's; negative clonus.  Positive Spurling's bilaterally.   Psychiatric:         Behavior: Behavior is cooperative.         Thought Content: Thought content normal.       Neurologic Exam     Mental Status   Oriented to person, place, and time.     Assessment/Plan   Independent Review of Radiographic Studies:      I personally reviewed the images from the following studies.    CT imaging of the cervical spine performed without contrast dated December 22, 2021 revealed no evidence of fracture.  Multilevel degenerative changes noted.  There is erosion of the left C2-3 and C3-4 facet joints.  This could either be degenerative in nature but could also be infectious pending on the patient's physical complaints and exam findings.  There are multiple areas of severe bilateral facet arthropathy particularly at C3-4.  There is also severe left facet arthropathy at C2-3 and severe left neuroforaminal narrowing.  No evidence of canal stenosis C4-5 or C5-6.  Mild stenosis with severe right neuroforaminal narrowing at C6-7.    X-ray images of the cervical spine performed October 14, 2021 showed normal alignment.  Multilevel degenerative disc disease and facet joint hypertrophy noted.  The craniocervical junction and the dens both appear  intact.    Review of Laboratory Studies:    Component      Latest Ref Rng & Units 10/14/2021   C-Reactive Protein      0.00 - 0.50 mg/dL 15.26 (H)       Component      Latest Ref Rng & Units 3/5/2020   Sed Rate      0 - 30 mm/hr 48 (H)       Component      Latest Ref Rng & Units 2/5/2019             WBC      3.40 - 10.80 10*3/mm3 6.91       Medical Decision Making:      Ms. Taylor will be referred for MRI imaging of the cervical spine with and without contrast due to the finding of possible infectious versus arthritic changes in the upper cervical facet joints.  She has an implanted loop recorder that I am told is MRI compatible.  I have given instructions for scheduling to consult with her cardiologist for any needed procedural instructions. We will obtain new CRP, ESR and CBC for comparison to previous results.  This lab work was performed in October 2021.  Results are posted above.    Ms. Taylor will return to office after imaging and lab work for a visit with Dr. Doherty.      Diagnoses and all orders for this visit:    1. Cervical radiculopathy due to degenerative joint disease of spine (Primary)  -     Cancel: MRI Cervical Spine With & Without Contrast; Future  -     C-reactive Protein; Future  -     Sedimentation Rate; Future  -     CBC & Differential; Future  -     XR spine cervical ap and lat w flex and ext; Future  -     MRI Cervical Spine With & Without Contrast; Future    2. Abnormal CT scan, cervical spine  -     Cancel: MRI Cervical Spine With & Without Contrast; Future  -     C-reactive Protein; Future  -     Sedimentation Rate; Future  -     CBC & Differential; Future  -     XR spine cervical ap and lat w flex and ext; Future  -     MRI Cervical Spine With & Without Contrast; Future    3. Cervical radiculopathy  -     Cancel: MRI Cervical Spine With & Without Contrast; Future  -     XR spine cervical ap and lat w flex and ext; Future  -     MRI Cervical Spine With & Without Contrast; Future    4. Leg  heaviness  -     Cancel: MRI Cervical Spine With & Without Contrast; Future  -     MRI Cervical Spine With & Without Contrast; Future    5. Rheumatoid aortitis  -     C-reactive Protein; Future  -     Sedimentation Rate; Future  -     CBC & Differential; Future  -     XR spine cervical ap and lat w flex and ext; Future      Return for Dr. Doherty after MRI C spine and ordered labs are complete, after radiographic imaging.

## 2022-01-18 ENCOUNTER — TELEPHONE (OUTPATIENT)
Dept: NEUROSURGERY | Facility: CLINIC | Age: 82
End: 2022-01-18

## 2022-01-18 ENCOUNTER — OFFICE VISIT (OUTPATIENT)
Dept: NEUROSURGERY | Facility: CLINIC | Age: 82
End: 2022-01-18

## 2022-01-18 VITALS
HEIGHT: 64 IN | OXYGEN SATURATION: 97 % | SYSTOLIC BLOOD PRESSURE: 114 MMHG | DIASTOLIC BLOOD PRESSURE: 66 MMHG | TEMPERATURE: 98.2 F | WEIGHT: 140 LBS | BODY MASS INDEX: 23.9 KG/M2 | HEART RATE: 97 BPM

## 2022-01-18 DIAGNOSIS — R93.7 ABNORMAL CT SCAN, CERVICAL SPINE: ICD-10-CM

## 2022-01-18 DIAGNOSIS — R29.898 LEG HEAVINESS: ICD-10-CM

## 2022-01-18 DIAGNOSIS — M54.12 CERVICAL RADICULOPATHY: ICD-10-CM

## 2022-01-18 DIAGNOSIS — I01.1 RHEUMATOID AORTITIS: ICD-10-CM

## 2022-01-18 DIAGNOSIS — M47.22 CERVICAL RADICULOPATHY DUE TO DEGENERATIVE JOINT DISEASE OF SPINE: Primary | ICD-10-CM

## 2022-01-18 PROCEDURE — 99204 OFFICE O/P NEW MOD 45 MIN: CPT | Performed by: NURSE PRACTITIONER

## 2022-01-18 RX ORDER — LANOLIN ALCOHOL/MO/W.PET/CERES
400 CREAM (GRAM) TOPICAL DAILY
COMMUNITY
End: 2022-10-05 | Stop reason: ALTCHOICE

## 2022-01-18 RX ORDER — VIT C/B6/B5/MAGNESIUM/HERB 173 50-5-6-5MG
1 CAPSULE ORAL DAILY
COMMUNITY

## 2022-01-18 RX ORDER — PSEUDOEPHEDRINE HCL 30 MG
TABLET ORAL
COMMUNITY

## 2022-01-18 RX ORDER — DIPHENOXYLATE HYDROCHLORIDE AND ATROPINE SULFATE 2.5; .025 MG/1; MG/1
1 TABLET ORAL DAILY
COMMUNITY

## 2022-01-18 RX ORDER — HYDROXYCHLOROQUINE SULFATE 200 MG/1
TABLET, FILM COATED ORAL
COMMUNITY
Start: 2022-01-17

## 2022-01-18 RX ORDER — POLYETHYLENE GLYCOL 3350 17 G/17G
17 POWDER, FOR SOLUTION ORAL
COMMUNITY
End: 2022-04-25 | Stop reason: ALTCHOICE

## 2022-01-18 RX ORDER — NIFEDIPINE 90 MG/1
TABLET, FILM COATED, EXTENDED RELEASE ORAL
COMMUNITY
Start: 2022-01-17 | End: 2022-04-25 | Stop reason: SDUPTHER

## 2022-01-18 RX ORDER — METHOTREXATE 2.5 MG/1
TABLET ORAL
COMMUNITY
Start: 2022-01-12

## 2022-01-18 RX ORDER — PREDNISONE 1 MG/1
TABLET ORAL
COMMUNITY
Start: 2022-01-11 | End: 2022-04-25 | Stop reason: ALTCHOICE

## 2022-01-18 NOTE — TELEPHONE ENCOUNTER
Called Dr Haroldo Cristobal and spoke to Ghassan for clearance for a MRI and Xray.  Ghassan will send a message over to Dr. Cristobal.

## 2022-01-19 ENCOUNTER — TELEPHONE (OUTPATIENT)
Dept: NEUROSURGERY | Facility: CLINIC | Age: 82
End: 2022-01-19

## 2022-01-19 NOTE — TELEPHONE ENCOUNTER
Jumana from Dr. Cristobal office called and they are checking with the cardiac team to make sure that the loop recorder is MRI compatable.  Will faxed information once confirmed

## 2022-01-21 ENCOUNTER — TELEPHONE (OUTPATIENT)
Dept: NEUROSURGERY | Facility: CLINIC | Age: 82
End: 2022-01-21

## 2022-01-21 ENCOUNTER — PATIENT ROUNDING (BHMG ONLY) (OUTPATIENT)
Dept: NEUROSURGERY | Facility: CLINIC | Age: 82
End: 2022-01-21

## 2022-02-07 ENCOUNTER — HOSPITAL ENCOUNTER (OUTPATIENT)
Dept: MRI IMAGING | Facility: HOSPITAL | Age: 82
Discharge: HOME | End: 2022-02-07
Admitting: NURSE PRACTITIONER

## 2022-02-07 DIAGNOSIS — M47.22 CERVICAL RADICULOPATHY DUE TO DEGENERATIVE JOINT DISEASE OF SPINE: ICD-10-CM

## 2022-02-07 DIAGNOSIS — M54.12 CERVICAL RADICULOPATHY: ICD-10-CM

## 2022-02-07 DIAGNOSIS — R93.7 ABNORMAL CT SCAN, CERVICAL SPINE: ICD-10-CM

## 2022-02-07 DIAGNOSIS — R29.898 LEG HEAVINESS: ICD-10-CM

## 2022-02-07 LAB — CREAT BLDA-MCNC: 0.6 MG/DL

## 2022-02-07 PROCEDURE — 82565 ASSAY OF CREATININE: CPT

## 2022-02-07 PROCEDURE — 0 GADOBENATE DIMEGLUMINE 529 MG/ML SOLUTION: Performed by: NURSE PRACTITIONER

## 2022-02-07 PROCEDURE — 72156 MRI NECK SPINE W/O & W/DYE: CPT

## 2022-02-07 PROCEDURE — A9577 INJ MULTIHANCE: HCPCS | Performed by: NURSE PRACTITIONER

## 2022-02-07 RX ADMIN — GADOBENATE DIMEGLUMINE 13 ML: 529 INJECTION, SOLUTION INTRAVENOUS at 13:26

## 2022-02-08 NOTE — PROGRESS NOTES
Please arrange for patient to get a new CBC w diff, CRP, ESR completed and a f/u with Dr. Doherty this week to review the MRI C spine just completed. She had elevated inflammatory markers previously and we need to check for comparison. I suspect that the findings on MRI are just inflammatory as she has rheumatologic issues, but we need to make sure this isn't infectious. When you speak to patient please ask how she is feeling. If she reports any fever, chills, extreme malaise, can't get out of bed, let me know.     Thanks

## 2022-02-11 DIAGNOSIS — R93.7 ABNORMAL CT SCAN, CERVICAL SPINE: Primary | ICD-10-CM

## 2022-02-14 ENCOUNTER — TELEPHONE (OUTPATIENT)
Dept: NEUROSURGERY | Facility: CLINIC | Age: 82
End: 2022-02-14

## 2022-02-14 ENCOUNTER — LAB (OUTPATIENT)
Dept: LAB | Facility: HOSPITAL | Age: 82
End: 2022-02-14

## 2022-02-14 DIAGNOSIS — R93.7 ABNORMAL CT SCAN, CERVICAL SPINE: ICD-10-CM

## 2022-02-14 LAB
BASOPHILS # BLD AUTO: 0.03 10*3/MM3 (ref 0–0.2)
BASOPHILS NFR BLD AUTO: 0.5 % (ref 0–1.5)
CRP SERPL-MCNC: 3.07 MG/DL (ref 0–0.5)
DEPRECATED RDW RBC AUTO: 43.8 FL (ref 37–54)
EOSINOPHIL # BLD AUTO: 0.12 10*3/MM3 (ref 0–0.4)
EOSINOPHIL NFR BLD AUTO: 2 % (ref 0.3–6.2)
ERYTHROCYTE [DISTWIDTH] IN BLOOD BY AUTOMATED COUNT: 14.5 % (ref 12.3–15.4)
ERYTHROCYTE [SEDIMENTATION RATE] IN BLOOD: 47 MM/HR (ref 0–30)
HCT VFR BLD AUTO: 35.4 % (ref 34–46.6)
HGB BLD-MCNC: 11.8 G/DL (ref 12–15.9)
IMM GRANULOCYTES # BLD AUTO: 0.05 10*3/MM3 (ref 0–0.05)
IMM GRANULOCYTES NFR BLD AUTO: 0.8 % (ref 0–0.5)
LYMPHOCYTES # BLD AUTO: 1.24 10*3/MM3 (ref 0.7–3.1)
LYMPHOCYTES NFR BLD AUTO: 20.2 % (ref 19.6–45.3)
MCH RBC QN AUTO: 28.3 PG (ref 26.6–33)
MCHC RBC AUTO-ENTMCNC: 33.3 G/DL (ref 31.5–35.7)
MCV RBC AUTO: 84.9 FL (ref 79–97)
MONOCYTES # BLD AUTO: 0.64 10*3/MM3 (ref 0.1–0.9)
MONOCYTES NFR BLD AUTO: 10.4 % (ref 5–12)
NEUTROPHILS NFR BLD AUTO: 4.05 10*3/MM3 (ref 1.7–7)
NEUTROPHILS NFR BLD AUTO: 66.1 % (ref 42.7–76)
NRBC BLD AUTO-RTO: 0 /100 WBC (ref 0–0.2)
PLATELET # BLD AUTO: 360 10*3/MM3 (ref 140–450)
PMV BLD AUTO: 9.9 FL (ref 6–12)
RBC # BLD AUTO: 4.17 10*6/MM3 (ref 3.77–5.28)
WBC NRBC COR # BLD: 6.13 10*3/MM3 (ref 3.4–10.8)

## 2022-02-14 PROCEDURE — 36415 COLL VENOUS BLD VENIPUNCTURE: CPT

## 2022-02-14 PROCEDURE — 85025 COMPLETE CBC W/AUTO DIFF WBC: CPT

## 2022-02-14 PROCEDURE — 86140 C-REACTIVE PROTEIN: CPT

## 2022-02-14 PROCEDURE — 85652 RBC SED RATE AUTOMATED: CPT

## 2022-02-14 NOTE — TELEPHONE ENCOUNTER
I spoke with the patient and advised that she can go get the labs drawn that Tameka ordered and we will call with results.     She reports that she is not feeling well and her blood pressure has been wacky, I advised her that she should reach out to her PCP regarding how she is taking her BP meds. She reports that she might have chills at night. She denies any fever. She states that she has a sinus infection or cold, unsure what it is. She also states that she is able to get out of bed and sit in a chair or on the couch.

## 2022-02-14 NOTE — TELEPHONE ENCOUNTER
----- Message from ARMINDA Dial sent at 2/11/2022  6:19 PM EST -----  It seems as though you did not see the orders for 2/7. So I redid them.

## 2022-02-15 NOTE — TELEPHONE ENCOUNTER
This patient needs an office appointment with a physician. I thought that she had been scheduled to see Dr. Doherty.

## 2022-02-15 NOTE — PROGRESS NOTES
As we spoke earlier, this patient will be seeing Dr. Doherty soon. I have reviewed the lab work. CRP and sed rate both elevated, but mildly. CBC showed normal WBCs. Plan to have patient see Dr. Doherty as previously ordered.

## 2022-03-11 NOTE — PROGRESS NOTES
"Subjective   Patient ID: Patricia Donahue is a 81 y.o. female is here today for follow-up with a new MRI Cervical spine done on 02.07.2022 at [facility].  Patient complains of neck pain and left arm pain.     Today     Patient, Provider, and MA are all wearing a mask in our office today.     History of Present Illness     This patient returns today.  She still has some pain in her neck and into her left arm but it is not nearly as bad as it was last fall.  She is currently being treated with methotrexate and that seems to be helping.    The following portions of the patient's history were reviewed and updated as appropriate: allergies, current medications, past family history, past medical history, past social history, past surgical history and problem list.    Review of Systems   Musculoskeletal: Positive for neck pain and neck stiffness.   Allergic/Immunologic: Negative.    Neurological: Positive for weakness and numbness. Negative for headaches.   Psychiatric/Behavioral: Negative for sleep disturbance.       I have reviewed the review of systems as documented by my MA.      Objective     Vitals:    03/15/22 1408   BP: 126/68   BP Location: Left arm   Patient Position: Sitting   Cuff Size: Large Adult   Pulse: 82   Resp: 18   SpO2: 97%   Weight: 60.8 kg (134 lb)   Height: 162.6 cm (64\")   PainSc:   5     Body mass index is 23 kg/m².      Physical Exam  Neurological:      Mental Status: She is alert and oriented to person, place, and time.       Neurologic Exam     Mental Status   Oriented to person, place, and time.           Assessment/Plan   Independent Review of Radiographic Studies:      I personally reviewed the images from the following studies.    I reviewed an MRI of the cervical spine done on 7 February of this year.  This was done at [facility].  There is multilevel degenerative disease.  There is some edema and enhancement in the C2-3 and C3-4 facet joints.  I was fairly underwhelmed with the " level of stenosis at any level but the radiologist felt there was severe foraminal stenosis at C3-4 and C4-5.    I also looked at her lab work.  And while her CRP and sed rate are still elevated they are markedly improved from what they were on the last draw.  Her white count has been normal all along.    Medical Decision Making:      I told the patient about the imaging.  I do not think she has an infection but I do think she has inflammation in those joints.  I told her that from my point of view I had initially planned to do a myelogram but with her feeling better I do not see that there is any reason to do that.  There is clearly nothing dangerous here.  And since she is feeling better I would not do any surgery on her at her age anyway.  She will call if anything gets worse.    Diagnoses and all orders for this visit:    1. Cervical spinal stenosis (Primary)      Return if symptoms worsen or fail to improve.

## 2022-03-15 ENCOUNTER — OFFICE VISIT (OUTPATIENT)
Dept: NEUROSURGERY | Facility: CLINIC | Age: 82
End: 2022-03-15

## 2022-03-15 VITALS
SYSTOLIC BLOOD PRESSURE: 126 MMHG | RESPIRATION RATE: 18 BRPM | WEIGHT: 134 LBS | BODY MASS INDEX: 22.88 KG/M2 | HEIGHT: 64 IN | DIASTOLIC BLOOD PRESSURE: 68 MMHG | HEART RATE: 82 BPM | OXYGEN SATURATION: 97 %

## 2022-03-15 DIAGNOSIS — M48.02 CERVICAL SPINAL STENOSIS: Primary | ICD-10-CM

## 2022-03-15 PROCEDURE — 99213 OFFICE O/P EST LOW 20 MIN: CPT | Performed by: NEUROLOGICAL SURGERY

## 2022-03-21 ENCOUNTER — TRANSCRIBE ORDERS (OUTPATIENT)
Dept: ADMINISTRATIVE | Facility: HOSPITAL | Age: 82
End: 2022-03-21

## 2022-03-21 DIAGNOSIS — R09.89 ARTERIAL BRUIT: Primary | ICD-10-CM

## 2022-04-01 ENCOUNTER — HOSPITAL ENCOUNTER (OUTPATIENT)
Dept: CARDIOLOGY | Facility: HOSPITAL | Age: 82
Discharge: HOME | End: 2022-04-01
Admitting: FAMILY MEDICINE

## 2022-04-01 DIAGNOSIS — R09.89 ARTERIAL BRUIT: ICD-10-CM

## 2022-04-01 LAB
BH CV XLRA MEAS LEFT CAROTID BULB EDV: 7 CM/SEC
BH CV XLRA MEAS LEFT CAROTID BULB PSV: 25 CM/SEC
BH CV XLRA MEAS LEFT DIST CCA EDV: 14 CM/SEC
BH CV XLRA MEAS LEFT DIST CCA PSV: 75 CM/SEC
BH CV XLRA MEAS LEFT DIST ICA EDV: 21 CM/SEC
BH CV XLRA MEAS LEFT DIST ICA PSV: 58 CM/SEC
BH CV XLRA MEAS LEFT MID ICA EDV: 20 CM/SEC
BH CV XLRA MEAS LEFT MID ICA PSV: 65 CM/SEC
BH CV XLRA MEAS LEFT PROX CCA EDV: 17 CM/SEC
BH CV XLRA MEAS LEFT PROX CCA PSV: 80 CM/SEC
BH CV XLRA MEAS LEFT PROX ECA EDV: 8 CM/SEC
BH CV XLRA MEAS LEFT PROX ECA PSV: 69 CM/SEC
BH CV XLRA MEAS LEFT PROX ICA EDV: 18 CM/SEC
BH CV XLRA MEAS LEFT PROX ICA PSV: 58 CM/SEC
BH CV XLRA MEAS LEFT VERTEBRAL A EDV: 8 CM/SEC
BH CV XLRA MEAS LEFT VERTEBRAL A PSV: 36 CM/SEC
BH CV XLRA MEAS RIGHT CAROTID BULB EDV: 11 CM/SEC
BH CV XLRA MEAS RIGHT CAROTID BULB PSV: 71 CM/SEC
BH CV XLRA MEAS RIGHT DIST CCA EDV: 14 CM/SEC
BH CV XLRA MEAS RIGHT DIST CCA PSV: 77 CM/SEC
BH CV XLRA MEAS RIGHT DIST ICA EDV: 14 CM/SEC
BH CV XLRA MEAS RIGHT DIST ICA PSV: 80 CM/SEC
BH CV XLRA MEAS RIGHT MID ICA EDV: 15 CM/SEC
BH CV XLRA MEAS RIGHT MID ICA PSV: 51 CM/SEC
BH CV XLRA MEAS RIGHT PROX CCA EDV: 15 CM/SEC
BH CV XLRA MEAS RIGHT PROX CCA PSV: 85 CM/SEC
BH CV XLRA MEAS RIGHT PROX ECA EDV: 16 CM/SEC
BH CV XLRA MEAS RIGHT PROX ECA PSV: 105 CM/SEC
BH CV XLRA MEAS RIGHT PROX ICA EDV: 10 CM/SEC
BH CV XLRA MEAS RIGHT PROX ICA PSV: 53 CM/SEC
BH CV XLRA MEAS RIGHT VERTEBRAL A EDV: 16 CM/SEC
BH CV XLRA MEAS RIGHT VERTEBRAL A PSV: 50 CM/SEC
LEFT ARM BP: NORMAL MMHG
MAXIMAL PREDICTED HEART RATE: 141 BPM
RIGHT ARM BP: NORMAL MMHG
STRESS TARGET HR: 120 BPM

## 2022-04-01 PROCEDURE — 93880 EXTRACRANIAL BILAT STUDY: CPT | Performed by: SURGERY

## 2022-04-01 PROCEDURE — 93880 EXTRACRANIAL BILAT STUDY: CPT

## 2022-04-25 ENCOUNTER — OFFICE VISIT (OUTPATIENT)
Dept: PODIATRY | Facility: CLINIC | Age: 82
End: 2022-04-25

## 2022-04-25 VITALS
HEART RATE: 87 BPM | DIASTOLIC BLOOD PRESSURE: 59 MMHG | HEIGHT: 64 IN | TEMPERATURE: 96.9 F | WEIGHT: 136 LBS | OXYGEN SATURATION: 99 % | SYSTOLIC BLOOD PRESSURE: 152 MMHG | BODY MASS INDEX: 23.22 KG/M2

## 2022-04-25 DIAGNOSIS — M79.671 FOOT PAIN, BILATERAL: Primary | ICD-10-CM

## 2022-04-25 DIAGNOSIS — B35.1 ONYCHOMYCOSIS: ICD-10-CM

## 2022-04-25 DIAGNOSIS — L60.0 ONYCHOCRYPTOSIS: ICD-10-CM

## 2022-04-25 DIAGNOSIS — M79.672 FOOT PAIN, BILATERAL: Primary | ICD-10-CM

## 2022-04-25 PROCEDURE — 11721 DEBRIDE NAIL 6 OR MORE: CPT | Performed by: PODIATRIST

## 2022-04-25 PROCEDURE — 99203 OFFICE O/P NEW LOW 30 MIN: CPT | Performed by: PODIATRIST

## 2022-04-25 RX ORDER — KETOCONAZOLE 20 MG/G
CREAM TOPICAL
COMMUNITY
Start: 2022-04-05

## 2022-04-25 RX ORDER — LEVOTHYROXINE SODIUM 50 MCG
TABLET ORAL
COMMUNITY
Start: 2022-04-03

## 2022-04-25 NOTE — PROGRESS NOTES
[facility] - PODIATRY    Today's Date: 22    Patient Name: Patricia Donahue  MRN: [id number]  CSN: [id number]  PCP: Ladarius Crockett MD, Last PCP Visit: 2022  Referring Provider: Referring, Self    SUBJECTIVE     Chief Complaint   Patient presents with   • Left Foot - Nail Problem   • Right Foot - Nail Problem     HPI: Patricia Donahue, a 81 y.o.female, comes to clinic.    New, Established, New Problem:  New  Location:  Toenails  Duration:   Greater than five years  Onset:  Gradual  Nature:  sore with palpation.  Stable, worsening, improving:   Worsening  Aggravating factors:  Pain with shoe gear and ambulation.  Previous Treatment: Unable to trim their own toenails due to rheumatoid arthritis.    No other pedal complaints at this time.    Patient denies any fevers, chills, nausea, vomiting, shortness of breath, nor any other constitutional signs nor symptoms.       Past Medical History:   Diagnosis Date   • Autoimmune disease (HCC)     unknown name   • Hypertension    • Neck pain      History reviewed. No pertinent surgical history.  Family History   Problem Relation Age of Onset   • Hypertension Mother    • Hypertension Father    • Cancer Brother      Social History     Socioeconomic History   • Marital status:    Tobacco Use   • Smoking status: Former Smoker     Quit date: 2020     Years since quittin.3   • Smokeless tobacco: Never Used   Vaping Use   • Vaping Use: Never used   Substance and Sexual Activity   • Alcohol use: Not Currently   • Drug use: Never   • Sexual activity: Defer     No Known Allergies  Current Outpatient Medications   Medication Sig Dispense Refill   • Aspirin 81 MG capsule Take 81 mg by mouth Daily.     • calcium citrate-vitamin d (CALCITRATE) 315-250 MG-UNIT tablet tablet Take 1 tablet by mouth 2 (two) times a day.     • diphenhydrAMINE (BENADRYL) 25 mg capsule Take 25 mg by mouth Every Night.     • docusate sodium 100 MG capsule docusate sodium 100 mg  oral capsule take 1 capsule (100 mg) by oral route once daily   Active     • esomeprazole (nexIUM) 40 MG capsule Take 40 mg by mouth 2 (Two) Times a Day.     • folic acid (FOLVITE) 400 MCG tablet Take 400 mcg by mouth Daily.     • hydroxychloroquine (PLAQUENIL) 200 MG tablet      • HYDROXYCHLOROQUINE SULFATE PO Take 200 mg by mouth 2 (two) times a day.     • ibuprofen (ADVIL,MOTRIN) 800 MG tablet Take 800 mg by mouth 2 (Two) Times a Day.     • ketoconazole (NIZORAL) 2 % cream      • levothyroxine (SYNTHROID, LEVOTHROID) 100 MCG tablet Take 1 tablet by mouth Every Morning. 90 tablet 0   • methotrexate 2.5 MG tablet      • multivitamin (THERAGRAN) tablet tablet Take 1 tablet by mouth Daily.     • NIFEdipine XL (PROCARDIA XL) 90 MG 24 hr tablet Take 90 mg by mouth Daily.     • Synthroid 50 MCG tablet      • telmisartan (MICARDIS) 80 MG tablet Take 80 mg by mouth Daily.     • Turmeric 500 MG capsule Take 1 tablet by mouth Daily.       No current facility-administered medications for this visit.     Review of Systems   Constitutional: Negative.    Skin:        Painful toenails.   All other systems reviewed and are negative.      OBJECTIVE     Vitals:    04/25/22 1010   BP: 152/59   Pulse: 87   Temp: 96.9 °F (36.1 °C)   SpO2: 99%       Patient seen in no apparent distress.      PHYSICAL EXAM:     Foot/Ankle Exam:       General:   Appearance: elderly    Appearance comment:  Chronically ill  Orientation: AAOx3    Affect: appropriate    Gait: unimpaired    Shoe Gear:  Casual shoes    VASCULAR      Right Foot Vascularity   Normal vascular exam    Dorsalis pedis:  Absent  Posterior tibial:  Absent  Skin Temperature: cool    Edema Grading:  None  CFT:  3  Pedal Hair Growth:  Absent  Varicosities: mild varicosities       Left Foot Vascularity   Normal vascular exam    Dorsalis pedis:  Absent  Posterior tibial:  Absent  Skin Temperature: cool    Edema Grading:  None  CFT:  3  Pedal Hair Growth:  Absent  Varicosities: mild  varicosities        NEUROLOGIC     Right Foot Neurologic   Normal sensation    Light touch sensation:  Normal  Vibratory sensation:  Normal  Hot/Cold sensation: normal    Protective Sensation using Denison-Yamileth Monofilament:  10     Left Foot Neurologic   Normal sensation    Light touch sensation:  Normal  Vibratory sensation:  Normal  Hot/cold sensation: normal    Protective Sensation using Denison-Yamileth Monofilament:  10     MUSCLE STRENGTH     Right Foot Muscle Strength   Foot dorsiflexion:  4  Foot plantar flexion:  4  Foot inversion:  4  Foot eversion:  4     Left Foot Muscle Strength   Foot dorsiflexion:  4  Foot plantar flexion:  4  Foot inversion:  4  Foot eversion:  4     RANGE OF MOTION      Right Foot Range of Motion   Foot and ankle ROM within normal limits       Left Foot Range of Motion   Foot and ankle ROM within normal limits       DERMATOLOGIC     Right Foot Dermatologic   Skin: skin intact    Nails: onychomycosis, abnormally thick, subungual debris, dystrophic nails and ingrown toenail    Nails comment:  Toenails 1, 2, 3, 4, and 5     Left Foot Dermatologic   Skin: skin intact    Nails: onychomycosis, abnormally thick, subungual debris, dystrophic nails and ingrown toenail    Nails comment:  Toenails 1, 2, 3, 4, and 5      ASSESSMENT/PLAN     Diagnoses and all orders for this visit:    1. Foot pain, bilateral (Primary)    2. Onychomycosis    3. Onychocryptosis        Comprehensive lower extremity examination and evaluation was performed.    Discussed findings and treatment plan including risks, benefits, and treatment options with patient in detail. Patient agreed with treatment plan.    Toenails 1, 2, 3, 4, 5 on Right and 1, 2, 3, 4, 5 on Left were debrided with nail nippers then filed with a Ashermel nail roxanne.  Patient tolerated procedure well without complications.    An After Visit Summary was printed and given to the patient at discharge, including (if requested) any available  informative/educational handouts regarding diagnosis, treatment, or medications. All questions were answered to patient/family satisfaction. Should symptoms fail to improve or worsen they agree to call or return to clinic or to go to the Emergency Department. Discussed the importance of following up with any needed screening tests/labs/specialist appointments and any requested follow-up recommended by me today. Importance of maintaining follow-up discussed and patient accepts that missed appointments can delay diagnosis and potentially lead to worsening of conditions.    Return in about 9 weeks (around 6/27/2022) for Toenail Care., or sooner if acute issues arise.    This document has been electronically signed by Ministerio Wells DPM on April 25, 2022 10:32 EDT

## 2022-04-28 ENCOUNTER — PATIENT ROUNDING (BHMG ONLY) (OUTPATIENT)
Dept: PODIATRY | Facility: CLINIC | Age: 82
End: 2022-04-28

## 2022-05-17 ENCOUNTER — TRANSCRIBE ORDERS (OUTPATIENT)
Dept: ADMINISTRATIVE | Facility: HOSPITAL | Age: 82
End: 2022-05-17

## 2022-05-17 ENCOUNTER — LAB (OUTPATIENT)
Dept: LAB | Facility: HOSPITAL | Age: 82
End: 2022-05-17

## 2022-05-17 DIAGNOSIS — M13.0 POLYARTHROPATHY: ICD-10-CM

## 2022-05-17 DIAGNOSIS — Z79.899 ENCOUNTER FOR LONG-TERM (CURRENT) USE OF OTHER MEDICATIONS: ICD-10-CM

## 2022-05-17 DIAGNOSIS — R70.0 ELEVATED SEDIMENTATION RATE: ICD-10-CM

## 2022-05-17 DIAGNOSIS — M13.0 POLYARTHROPATHY: Primary | ICD-10-CM

## 2022-05-17 LAB
ALBUMIN SERPL-MCNC: 3.4 G/DL (ref 3.5–5.2)
ALBUMIN/GLOB SERPL: 1 G/DL
ALP SERPL-CCNC: 84 U/L (ref 39–117)
ALT SERPL W P-5'-P-CCNC: 16 U/L (ref 1–33)
ANION GAP SERPL CALCULATED.3IONS-SCNC: 12.8 MMOL/L (ref 5–15)
AST SERPL-CCNC: 21 U/L (ref 1–32)
BASOPHILS # BLD AUTO: 0.02 10*3/MM3 (ref 0–0.2)
BASOPHILS NFR BLD AUTO: 0.4 % (ref 0–1.5)
BILIRUB SERPL-MCNC: 0.2 MG/DL (ref 0–1.2)
BUN SERPL-MCNC: 10 MG/DL (ref 8–23)
BUN/CREAT SERPL: 15.4 (ref 7–25)
CALCIUM SPEC-SCNC: 9.7 MG/DL (ref 8.6–10.5)
CHLORIDE SERPL-SCNC: 103 MMOL/L (ref 98–107)
CO2 SERPL-SCNC: 23.2 MMOL/L (ref 22–29)
CREAT SERPL-MCNC: 0.65 MG/DL (ref 0.57–1)
DEPRECATED RDW RBC AUTO: 46.9 FL (ref 37–54)
EGFRCR SERPLBLD CKD-EPI 2021: 89.7 ML/MIN/1.73
EOSINOPHIL # BLD AUTO: 0.23 10*3/MM3 (ref 0–0.4)
EOSINOPHIL NFR BLD AUTO: 4.3 % (ref 0.3–6.2)
ERYTHROCYTE [DISTWIDTH] IN BLOOD BY AUTOMATED COUNT: 14.2 % (ref 12.3–15.4)
ERYTHROCYTE [SEDIMENTATION RATE] IN BLOOD: 22 MM/HR (ref 0–30)
GLOBULIN UR ELPH-MCNC: 3.3 GM/DL
GLUCOSE SERPL-MCNC: 77 MG/DL (ref 65–99)
HCT VFR BLD AUTO: 33.9 % (ref 34–46.6)
HGB BLD-MCNC: 11.1 G/DL (ref 12–15.9)
IMM GRANULOCYTES # BLD AUTO: 0.02 10*3/MM3 (ref 0–0.05)
IMM GRANULOCYTES NFR BLD AUTO: 0.4 % (ref 0–0.5)
LYMPHOCYTES # BLD AUTO: 0.98 10*3/MM3 (ref 0.7–3.1)
LYMPHOCYTES NFR BLD AUTO: 18.3 % (ref 19.6–45.3)
MCH RBC QN AUTO: 30.1 PG (ref 26.6–33)
MCHC RBC AUTO-ENTMCNC: 32.7 G/DL (ref 31.5–35.7)
MCV RBC AUTO: 91.9 FL (ref 79–97)
MONOCYTES # BLD AUTO: 0.72 10*3/MM3 (ref 0.1–0.9)
MONOCYTES NFR BLD AUTO: 13.5 % (ref 5–12)
NEUTROPHILS NFR BLD AUTO: 3.38 10*3/MM3 (ref 1.7–7)
NEUTROPHILS NFR BLD AUTO: 63.1 % (ref 42.7–76)
NRBC BLD AUTO-RTO: 0 /100 WBC (ref 0–0.2)
PLATELET # BLD AUTO: 294 10*3/MM3 (ref 140–450)
PMV BLD AUTO: 10.1 FL (ref 6–12)
POTASSIUM SERPL-SCNC: 4.2 MMOL/L (ref 3.5–5.2)
PROT SERPL-MCNC: 6.7 G/DL (ref 6–8.5)
RBC # BLD AUTO: 3.69 10*6/MM3 (ref 3.77–5.28)
SODIUM SERPL-SCNC: 139 MMOL/L (ref 136–145)
WBC NRBC COR # BLD: 5.35 10*3/MM3 (ref 3.4–10.8)

## 2022-05-17 PROCEDURE — 36415 COLL VENOUS BLD VENIPUNCTURE: CPT

## 2022-05-17 PROCEDURE — 80053 COMPREHEN METABOLIC PANEL: CPT

## 2022-05-17 PROCEDURE — 85652 RBC SED RATE AUTOMATED: CPT

## 2022-05-17 PROCEDURE — 85025 COMPLETE CBC W/AUTO DIFF WBC: CPT

## 2022-07-13 ENCOUNTER — OFFICE VISIT (OUTPATIENT)
Dept: PODIATRY | Facility: CLINIC | Age: 82
End: 2022-07-13

## 2022-07-13 VITALS
BODY MASS INDEX: 22.71 KG/M2 | HEART RATE: 76 BPM | OXYGEN SATURATION: 98 % | SYSTOLIC BLOOD PRESSURE: 156 MMHG | WEIGHT: 133 LBS | TEMPERATURE: 97.1 F | HEIGHT: 64 IN | DIASTOLIC BLOOD PRESSURE: 78 MMHG

## 2022-07-13 DIAGNOSIS — M79.671 FOOT PAIN, BILATERAL: Primary | ICD-10-CM

## 2022-07-13 DIAGNOSIS — B35.1 ONYCHOMYCOSIS: ICD-10-CM

## 2022-07-13 DIAGNOSIS — L60.0 ONYCHOCRYPTOSIS: ICD-10-CM

## 2022-07-13 DIAGNOSIS — M79.672 FOOT PAIN, BILATERAL: Primary | ICD-10-CM

## 2022-07-13 PROCEDURE — 11721 DEBRIDE NAIL 6 OR MORE: CPT | Performed by: PODIATRIST

## 2022-07-13 RX ORDER — FUROSEMIDE 40 MG/1
TABLET ORAL
COMMUNITY
Start: 2022-06-23

## 2022-07-13 NOTE — PROGRESS NOTES
[facility] - PODIATRY    Today's Date: 22    Patient Name: Patricia Donahue  MRN: [id number]  CSN: [id number]  PCP: Ladarius Crockett MD, Last PCP Visit: 2022  Referring Provider: No ref. provider found    SUBJECTIVE     Chief Complaint   Patient presents with   • Left Foot - Nail Problem   • Right Foot - Nail Problem     HPI: Patricia Donahue, a 81 y.o.female, comes to clinic.    New, Established, New Problem:  est  Location:  Toenails  Duration:   Greater than five years  Onset:  Gradual  Nature:  sore with palpation.  Stable, worsening, improving:   improving  Aggravating factors:  Pain with shoe gear and ambulation.  Previous Treatment: Unable to trim their own toenails due to rheumatoid arthritis.    Medical changes: Hospital admission due to CHF.    Patient denies any fevers, chills, nausea, vomiting, shortness of breath, nor any other constitutional signs nor symptoms.       Past Medical History:   Diagnosis Date   • Autoimmune disease (HCC)     unknown name   • Congestive heart failure (CHF) (HCC)    • Hypertension    • Neck pain      History reviewed. No pertinent surgical history.  Family History   Problem Relation Age of Onset   • Hypertension Mother    • Hypertension Father    • Cancer Brother      Social History     Socioeconomic History   • Marital status:    Tobacco Use   • Smoking status: Former Smoker     Quit date: 2020     Years since quittin.5   • Smokeless tobacco: Never Used   Vaping Use   • Vaping Use: Never used   Substance and Sexual Activity   • Alcohol use: Not Currently   • Drug use: Never   • Sexual activity: Defer     No Known Allergies  Current Outpatient Medications   Medication Sig Dispense Refill   • Aspirin 81 MG capsule Take 81 mg by mouth Daily.     • calcium citrate-vitamin d (CALCITRATE) 315-250 MG-UNIT tablet tablet Take 1 tablet by mouth 2 (two) times a day.     • diphenhydrAMINE (BENADRYL) 25 mg capsule Take 25 mg by mouth Every Night.     •  docusate sodium 100 MG capsule docusate sodium 100 mg oral capsule take 1 capsule (100 mg) by oral route once daily   Active     • esomeprazole (nexIUM) 40 MG capsule Take 40 mg by mouth 2 (Two) Times a Day.     • folic acid (FOLVITE) 400 MCG tablet Take 400 mcg by mouth Daily.     • furosemide (LASIX) 40 MG tablet 1qd     • hydroxychloroquine (PLAQUENIL) 200 MG tablet      • HYDROXYCHLOROQUINE SULFATE PO Take 200 mg by mouth 2 (two) times a day.     • ibuprofen (ADVIL,MOTRIN) 800 MG tablet Take 800 mg by mouth 2 (Two) Times a Day.     • ketoconazole (NIZORAL) 2 % cream      • levothyroxine (SYNTHROID, LEVOTHROID) 100 MCG tablet Take 1 tablet by mouth Every Morning. 90 tablet 0   • methotrexate 2.5 MG tablet      • multivitamin (THERAGRAN) tablet tablet Take 1 tablet by mouth Daily.     • NIFEdipine XL (PROCARDIA XL) 90 MG 24 hr tablet Take 90 mg by mouth Daily.     • Synthroid 50 MCG tablet      • telmisartan (MICARDIS) 80 MG tablet Take 80 mg by mouth Daily.     • Turmeric 500 MG capsule Take 1 tablet by mouth Daily.       No current facility-administered medications for this visit.     Review of Systems   Constitutional: Negative.    Skin:        Painful toenails.   All other systems reviewed and are negative.      OBJECTIVE     Vitals:    07/13/22 1101   BP: 156/78   Pulse: 76   Temp: 97.1 °F (36.2 °C)   SpO2: 98%       Patient seen in no apparent distress.      PHYSICAL EXAM:     Foot/Ankle Exam:       General:   Appearance: elderly    Appearance comment:  Chronically ill  Orientation: AAOx3    Affect: appropriate    Gait: unimpaired    Shoe Gear:  Casual shoes    VASCULAR      Right Foot Vascularity   Normal vascular exam    Dorsalis pedis:  Absent  Posterior tibial:  Absent  Skin Temperature: cool    Edema Grading:  None  CFT:  3  Pedal Hair Growth:  Absent  Varicosities: mild varicosities       Left Foot Vascularity   Normal vascular exam    Dorsalis pedis:  Absent  Posterior tibial:  Absent  Skin  Temperature: cool    Edema Grading:  None  CFT:  3  Pedal Hair Growth:  Absent  Varicosities: mild varicosities        NEUROLOGIC     Right Foot Neurologic   Normal sensation    Light touch sensation:  Normal  Vibratory sensation:  Normal  Hot/Cold sensation: normal    Protective Sensation using [place]-Yamileth Monofilament:  10     Left Foot Neurologic   Normal sensation    Light touch sensation:  Normal  Vibratory sensation:  Normal  Hot/cold sensation: normal    Protective Sensation using [place]-Yamileth Monofilament:  10     MUSCLE STRENGTH     Right Foot Muscle Strength   Foot dorsiflexion:  4  Foot plantar flexion:  4  Foot inversion:  4  Foot eversion:  4     Left Foot Muscle Strength   Foot dorsiflexion:  4  Foot plantar flexion:  4  Foot inversion:  4  Foot eversion:  4     RANGE OF MOTION      Right Foot Range of Motion   Foot and ankle ROM within normal limits       Left Foot Range of Motion   Foot and ankle ROM within normal limits       DERMATOLOGIC     Right Foot Dermatologic   Skin: skin intact    Nails: onychomycosis, abnormally thick, subungual debris, dystrophic nails and ingrown toenail    Nails comment:  Toenails 1, 2, 3, 4, and 5     Left Foot Dermatologic   Skin: skin intact    Nails: onychomycosis, abnormally thick, subungual debris, dystrophic nails and ingrown toenail    Nails comment:  Toenails 1, 2, 3, 4, and 5      ASSESSMENT/PLAN     Diagnoses and all orders for this visit:    1. Foot pain, bilateral (Primary)    2. Onychomycosis    3. Onychocryptosis        Comprehensive lower extremity examination and evaluation was performed.    Discussed findings and treatment plan including risks, benefits, and treatment options with patient in detail. Patient agreed with treatment plan.    Toenails 1, 2, 3, 4, 5 on Right and 1, 2, 3, 4, 5 on Left were debrided with nail nippers then filed with a [place] nail roxanne.  Patient tolerated procedure well without complications.    An After Visit Summary  was printed and given to the patient at discharge, including (if requested) any available informative/educational handouts regarding diagnosis, treatment, or medications. All questions were answered to patient/family satisfaction. Should symptoms fail to improve or worsen they agree to call or return to clinic or to go to the Emergency Department. Discussed the importance of following up with any needed screening tests/labs/specialist appointments and any requested follow-up recommended by me today. Importance of maintaining follow-up discussed and patient accepts that missed appointments can delay diagnosis and potentially lead to worsening of conditions.    Return in about 9 weeks (around 9/14/2022) for Toenail Care., or sooner if acute issues arise.    This document has been electronically signed by Ministerio Wells DPM on July 13, 2022 11:32 EDT

## 2022-09-28 ENCOUNTER — LAB (OUTPATIENT)
Dept: LAB | Facility: HOSPITAL | Age: 82
End: 2022-09-28

## 2022-09-28 ENCOUNTER — TRANSCRIBE ORDERS (OUTPATIENT)
Dept: ADMINISTRATIVE | Facility: HOSPITAL | Age: 82
End: 2022-09-28

## 2022-09-28 DIAGNOSIS — Z79.899 ENCOUNTER FOR LONG-TERM (CURRENT) USE OF OTHER MEDICATIONS: ICD-10-CM

## 2022-09-28 DIAGNOSIS — Z51.81 ENCOUNTER FOR THERAPEUTIC DRUG MONITORING: ICD-10-CM

## 2022-09-28 DIAGNOSIS — Z51.81 ENCOUNTER FOR THERAPEUTIC DRUG MONITORING: Primary | ICD-10-CM

## 2022-09-28 LAB
ANION GAP SERPL CALCULATED.3IONS-SCNC: 10 MMOL/L (ref 5–15)
BUN SERPL-MCNC: 11 MG/DL (ref 8–23)
BUN/CREAT SERPL: 13.9 (ref 7–25)
CALCIUM SPEC-SCNC: 9.7 MG/DL (ref 8.6–10.5)
CHLORIDE SERPL-SCNC: 104 MMOL/L (ref 98–107)
CO2 SERPL-SCNC: 28 MMOL/L (ref 22–29)
CREAT SERPL-MCNC: 0.79 MG/DL (ref 0.57–1)
EGFRCR SERPLBLD CKD-EPI 2021: 76.2 ML/MIN/1.73
GLUCOSE SERPL-MCNC: 121 MG/DL (ref 65–99)
POTASSIUM SERPL-SCNC: 4.4 MMOL/L (ref 3.5–5.2)
SODIUM SERPL-SCNC: 142 MMOL/L (ref 136–145)

## 2022-09-28 PROCEDURE — 36415 COLL VENOUS BLD VENIPUNCTURE: CPT

## 2022-09-28 PROCEDURE — 80048 BASIC METABOLIC PNL TOTAL CA: CPT

## 2022-10-05 ENCOUNTER — OFFICE VISIT (OUTPATIENT)
Dept: PODIATRY | Facility: CLINIC | Age: 82
End: 2022-10-05

## 2022-10-05 VITALS
HEIGHT: 64 IN | HEART RATE: 69 BPM | OXYGEN SATURATION: 100 % | SYSTOLIC BLOOD PRESSURE: 184 MMHG | TEMPERATURE: 96.9 F | DIASTOLIC BLOOD PRESSURE: 75 MMHG | BODY MASS INDEX: 22.88 KG/M2 | WEIGHT: 134 LBS

## 2022-10-05 DIAGNOSIS — L60.0 ONYCHOCRYPTOSIS: ICD-10-CM

## 2022-10-05 DIAGNOSIS — B35.1 ONYCHOMYCOSIS: ICD-10-CM

## 2022-10-05 DIAGNOSIS — M79.671 FOOT PAIN, BILATERAL: Primary | ICD-10-CM

## 2022-10-05 DIAGNOSIS — M79.672 FOOT PAIN, BILATERAL: Primary | ICD-10-CM

## 2022-10-05 PROCEDURE — 11721 DEBRIDE NAIL 6 OR MORE: CPT | Performed by: PODIATRIST

## 2022-10-05 RX ORDER — LOSARTAN POTASSIUM 25 MG/1
TABLET ORAL
COMMUNITY
Start: 2022-09-28

## 2022-10-05 RX ORDER — TELMISARTAN 40 MG/1
TABLET ORAL
COMMUNITY
Start: 2022-08-09

## 2022-10-05 RX ORDER — FOLIC ACID 1 MG/1
TABLET ORAL
COMMUNITY
Start: 2022-07-13

## 2022-10-05 RX ORDER — METOPROLOL SUCCINATE 25 MG/1
25 TABLET, EXTENDED RELEASE ORAL
COMMUNITY
Start: 2022-09-28

## 2022-10-05 NOTE — PROGRESS NOTES
[facility] - PODIATRY    Today's Date: 10/05/22    Patient Name: Patricia Donahue  MRN: [id number]  CSN: [id number]  PCP: Ladarius Crockett MD, Last PCP Visit: 2022  Referring Provider: No ref. provider found    SUBJECTIVE     Chief Complaint   Patient presents with   • Left Foot - Follow-up, Nail Problem   • Right Foot - Follow-up, Nail Problem     HPI: Patricia Donahue, a 81 y.o.female, comes to clinic.    New, Established, New Problem:  est  Location:  Toenails  Duration:   Greater than five years  Onset:  Gradual  Nature:  sore with palpation.  Stable, worsening, improving:   Stable  Aggravating factors:  Pain with shoe gear and ambulation.  Previous Treatment: Debridement    Medical changes: Changes in blood pressure medications    Patient denies any fevers, chills, nausea, vomiting, shortness of breath, nor any other constitutional signs nor symptoms.       Past Medical History:   Diagnosis Date   • Autoimmune disease (HCC)     unknown name   • Congestive heart failure (CHF) (HCC)    • Hypertension    • Neck pain      History reviewed. No pertinent surgical history.  Family History   Problem Relation Age of Onset   • Hypertension Mother    • Hypertension Father    • Cancer Brother      Social History     Socioeconomic History   • Marital status:    Tobacco Use   • Smoking status: Former Smoker     Quit date: 2020     Years since quittin.7   • Smokeless tobacco: Never Used   Vaping Use   • Vaping Use: Never used   Substance and Sexual Activity   • Alcohol use: Not Currently   • Drug use: Never   • Sexual activity: Defer     No Known Allergies  Current Outpatient Medications   Medication Sig Dispense Refill   • Aspirin 81 MG capsule Take 81 mg by mouth Daily.     • calcium citrate-vitamin d (CALCITRATE) 315-250 MG-UNIT tablet tablet Take 1 tablet by mouth 2 (two) times a day.     • diphenhydrAMINE (BENADRYL) 25 mg capsule Take 25 mg by mouth Every Night.     • docusate sodium 100 MG  capsule docusate sodium 100 mg oral capsule take 1 capsule (100 mg) by oral route once daily   Active     • esomeprazole (nexIUM) 40 MG capsule Take 40 mg by mouth 2 (Two) Times a Day.     • folic acid (FOLVITE) 1 MG tablet      • furosemide (LASIX) 40 MG tablet 1qd     • hydroxychloroquine (PLAQUENIL) 200 MG tablet      • HYDROXYCHLOROQUINE SULFATE PO Take 200 mg by mouth 2 (two) times a day.     • ibuprofen (ADVIL,MOTRIN) 800 MG tablet Take 800 mg by mouth 2 (Two) Times a Day.     • ketoconazole (NIZORAL) 2 % cream      • levothyroxine (SYNTHROID, LEVOTHROID) 100 MCG tablet Take 1 tablet by mouth Every Morning. 90 tablet 0   • losartan (COZAAR) 25 MG tablet      • methotrexate 2.5 MG tablet      • metoprolol succinate XL (TOPROL-XL) 25 MG 24 hr tablet      • multivitamin (THERAGRAN) tablet tablet Take 1 tablet by mouth Daily.     • NIFEdipine XL (PROCARDIA XL) 90 MG 24 hr tablet Take 90 mg by mouth Daily.     • Synthroid 50 MCG tablet      • telmisartan (MICARDIS) 40 MG tablet      • Turmeric 500 MG capsule Take 1 tablet by mouth Daily.       No current facility-administered medications for this visit.     Review of Systems   Constitutional: Negative.    Skin:        Painful toenails.   All other systems reviewed and are negative.      OBJECTIVE     Vitals:    10/05/22 1036   BP: (!) 184/75   Pulse: 69   Temp: 96.9 °F (36.1 °C)   SpO2: 100%       Patient seen in no apparent distress.      PHYSICAL EXAM:     Foot/Ankle Exam:       General:   Appearance: elderly    Appearance comment:  Chronically ill  Orientation: AAOx3    Affect: appropriate    Gait: unimpaired    Shoe Gear:  Casual shoes    VASCULAR      Right Foot Vascularity   Normal vascular exam    Dorsalis pedis:  Absent  Posterior tibial:  Absent  Skin Temperature: cool    Edema Grading:  None  CFT:  3  Pedal Hair Growth:  Absent  Varicosities: mild varicosities       Left Foot Vascularity   Normal vascular exam    Dorsalis pedis:  Absent  Posterior tibial:   Absent  Skin Temperature: cool    Edema Grading:  None  CFT:  3  Pedal Hair Growth:  Absent  Varicosities: mild varicosities        NEUROLOGIC     Right Foot Neurologic   Normal sensation    Light touch sensation:  Normal  Vibratory sensation:  Normal  Hot/Cold sensation: normal    Protective Sensation using [place]-Yamileth Monofilament:  10     Left Foot Neurologic   Normal sensation    Light touch sensation:  Normal  Vibratory sensation:  Normal  Hot/cold sensation: normal    Protective Sensation using [place]-Yamileth Monofilament:  10     MUSCLE STRENGTH     Right Foot Muscle Strength   Foot dorsiflexion:  4  Foot plantar flexion:  4  Foot inversion:  4  Foot eversion:  4     Left Foot Muscle Strength   Foot dorsiflexion:  4  Foot plantar flexion:  4  Foot inversion:  4  Foot eversion:  4     RANGE OF MOTION      Right Foot Range of Motion   Foot and ankle ROM within normal limits       Left Foot Range of Motion   Foot and ankle ROM within normal limits       DERMATOLOGIC     Right Foot Dermatologic   Skin: skin intact    Nails: onychomycosis, abnormally thick, subungual debris, dystrophic nails and ingrown toenail    Nails comment:  Toenails 1, 2, 3, 4, and 5     Left Foot Dermatologic   Skin: skin intact    Nails: onychomycosis, abnormally thick, subungual debris, dystrophic nails and ingrown toenail    Nails comment:  Toenails 1, 2, 3, 4, and 5      ASSESSMENT/PLAN     Diagnoses and all orders for this visit:    1. Foot pain, bilateral (Primary)    2. Onychomycosis    3. Onychocryptosis        Comprehensive lower extremity examination and evaluation was performed.    Discussed findings and treatment plan including risks, benefits, and treatment options with patient in detail. Patient agreed with treatment plan.    Toenails 1, 2, 3, 4, 5 on Right and 1, 2, 3, 4, 5 on Left were debrided with nail nippers then filed with a [place] nail roxanne.  Patient tolerated procedure well without complications.    An After  Visit Summary was printed and given to the patient at discharge, including (if requested) any available informative/educational handouts regarding diagnosis, treatment, or medications. All questions were answered to patient/family satisfaction. Should symptoms fail to improve or worsen they agree to call or return to clinic or to go to the Emergency Department. Discussed the importance of following up with any needed screening tests/labs/specialist appointments and any requested follow-up recommended by me today. Importance of maintaining follow-up discussed and patient accepts that missed appointments can delay diagnosis and potentially lead to worsening of conditions.    Return in about 9 weeks (around 12/7/2022) for Toenail Care., or sooner if acute issues arise.    This document has been electronically signed by Ministerio Wells DPM on October 5, 2022 10:53 EDT

## 2022-12-08 NOTE — PROGRESS NOTES
April 28, 2022    Hello, may I speak with Patricia Donahue?    My name is Poornima      I am  with [facility]  [address]  [place] [address] [phone].    Before we get started may I verify your date of birth? 1940    I am calling to officially welcome you to our practice and ask about your recent visit. Is this a good time to talk? yes    Tell me about your visit with us. What things went well?  all went well, very pleased, timing, didn't have to wait long       We're always looking for ways to make our patients' experiences even better. Do you have recommendations on ways we may improve?  no    Overall were you satisfied with your first visit to our practice? yes       I appreciate you taking the time to speak with me today. Is there anything else I can do for you? no      Thank you, and have a great day.       [facility] Pregnancy And Lactation Text: This medication is Pregnancy Category X and is absolutely contraindicated during pregnancy. It is unknown if it is excreted in breast milk.

## 2022-12-28 ENCOUNTER — TRANSCRIBE ORDERS (OUTPATIENT)
Dept: ADMINISTRATIVE | Facility: HOSPITAL | Age: 82
End: 2022-12-28
Payer: MEDICARE

## 2022-12-28 ENCOUNTER — LAB (OUTPATIENT)
Dept: LAB | Facility: HOSPITAL | Age: 82
End: 2022-12-28
Payer: MEDICARE

## 2022-12-28 DIAGNOSIS — Z79.899 ENCOUNTER FOR LONG-TERM (CURRENT) USE OF OTHER MEDICATIONS: ICD-10-CM

## 2022-12-28 DIAGNOSIS — M13.0 POLYARTHROPATHY: Primary | ICD-10-CM

## 2022-12-28 DIAGNOSIS — Z92.25 STATUS POST RECENT IMMUNOSUPPRESSIVE THERAPY: ICD-10-CM

## 2022-12-28 DIAGNOSIS — M13.0 POLYARTHROPATHY: ICD-10-CM

## 2022-12-28 LAB
ALBUMIN SERPL-MCNC: 4.2 G/DL (ref 3.5–5.2)
ALBUMIN/GLOB SERPL: 1.6 G/DL
ALP SERPL-CCNC: 88 U/L (ref 39–117)
ALT SERPL W P-5'-P-CCNC: 19 U/L (ref 1–33)
ANION GAP SERPL CALCULATED.3IONS-SCNC: 7 MMOL/L (ref 5–15)
AST SERPL-CCNC: 23 U/L (ref 1–32)
BASOPHILS # BLD AUTO: 0.02 10*3/MM3 (ref 0–0.2)
BASOPHILS NFR BLD AUTO: 0.4 % (ref 0–1.5)
BILIRUB SERPL-MCNC: 0.3 MG/DL (ref 0–1.2)
BUN SERPL-MCNC: 14 MG/DL (ref 8–23)
BUN/CREAT SERPL: 15.6 (ref 7–25)
CALCIUM SPEC-SCNC: 9.6 MG/DL (ref 8.6–10.5)
CHLORIDE SERPL-SCNC: 101 MMOL/L (ref 98–107)
CO2 SERPL-SCNC: 32 MMOL/L (ref 22–29)
CREAT SERPL-MCNC: 0.9 MG/DL (ref 0.57–1)
CRP SERPL-MCNC: 0.7 MG/DL (ref 0–0.5)
DEPRECATED RDW RBC AUTO: 44.3 FL (ref 37–54)
EGFRCR SERPLBLD CKD-EPI 2021: 64.8 ML/MIN/1.73
EOSINOPHIL # BLD AUTO: 0.12 10*3/MM3 (ref 0–0.4)
EOSINOPHIL NFR BLD AUTO: 2.5 % (ref 0.3–6.2)
ERYTHROCYTE [DISTWIDTH] IN BLOOD BY AUTOMATED COUNT: 13.2 % (ref 12.3–15.4)
GLOBULIN UR ELPH-MCNC: 2.7 GM/DL
GLUCOSE SERPL-MCNC: 136 MG/DL (ref 65–99)
HCT VFR BLD AUTO: 39.9 % (ref 34–46.6)
HGB BLD-MCNC: 13.4 G/DL (ref 12–15.9)
IMM GRANULOCYTES # BLD AUTO: 0.02 10*3/MM3 (ref 0–0.05)
IMM GRANULOCYTES NFR BLD AUTO: 0.4 % (ref 0–0.5)
LYMPHOCYTES # BLD AUTO: 0.96 10*3/MM3 (ref 0.7–3.1)
LYMPHOCYTES NFR BLD AUTO: 20.3 % (ref 19.6–45.3)
MCH RBC QN AUTO: 31.2 PG (ref 26.6–33)
MCHC RBC AUTO-ENTMCNC: 33.6 G/DL (ref 31.5–35.7)
MCV RBC AUTO: 93 FL (ref 79–97)
MONOCYTES # BLD AUTO: 0.35 10*3/MM3 (ref 0.1–0.9)
MONOCYTES NFR BLD AUTO: 7.4 % (ref 5–12)
NEUTROPHILS NFR BLD AUTO: 3.26 10*3/MM3 (ref 1.7–7)
NEUTROPHILS NFR BLD AUTO: 69 % (ref 42.7–76)
NRBC BLD AUTO-RTO: 0 /100 WBC (ref 0–0.2)
PLATELET # BLD AUTO: 242 10*3/MM3 (ref 140–450)
PMV BLD AUTO: 10 FL (ref 6–12)
POTASSIUM SERPL-SCNC: 4.4 MMOL/L (ref 3.5–5.2)
PROT SERPL-MCNC: 6.9 G/DL (ref 6–8.5)
RBC # BLD AUTO: 4.29 10*6/MM3 (ref 3.77–5.28)
SODIUM SERPL-SCNC: 140 MMOL/L (ref 136–145)
WBC NRBC COR # BLD: 4.73 10*3/MM3 (ref 3.4–10.8)

## 2022-12-28 PROCEDURE — 85025 COMPLETE CBC W/AUTO DIFF WBC: CPT

## 2022-12-28 PROCEDURE — 86140 C-REACTIVE PROTEIN: CPT

## 2022-12-28 PROCEDURE — 36415 COLL VENOUS BLD VENIPUNCTURE: CPT

## 2022-12-28 PROCEDURE — 80053 COMPREHEN METABOLIC PANEL: CPT

## 2023-01-06 ENCOUNTER — OFFICE VISIT (OUTPATIENT)
Dept: PODIATRY | Facility: CLINIC | Age: 83
End: 2023-01-06
Payer: MEDICARE

## 2023-01-06 VITALS
SYSTOLIC BLOOD PRESSURE: 182 MMHG | OXYGEN SATURATION: 99 % | HEART RATE: 72 BPM | BODY MASS INDEX: 22.88 KG/M2 | TEMPERATURE: 97.5 F | HEIGHT: 64 IN | WEIGHT: 134 LBS | DIASTOLIC BLOOD PRESSURE: 80 MMHG

## 2023-01-06 DIAGNOSIS — B35.1 ONYCHOMYCOSIS: ICD-10-CM

## 2023-01-06 DIAGNOSIS — M79.671 FOOT PAIN, BILATERAL: Primary | ICD-10-CM

## 2023-01-06 DIAGNOSIS — M79.672 FOOT PAIN, BILATERAL: Primary | ICD-10-CM

## 2023-01-06 DIAGNOSIS — L60.0 ONYCHOCRYPTOSIS: ICD-10-CM

## 2023-01-06 PROCEDURE — 11721 DEBRIDE NAIL 6 OR MORE: CPT | Performed by: PODIATRIST

## 2023-01-06 NOTE — PROGRESS NOTES
[facility] - PODIATRY    Today's Date: 01/06/23    Patient Name: Patricia Donahue  MRN: [id number]  CSN: [id number]  PCP: Ladarius Crockett MD, Last PCP Visit: 7/6/2022  Referring Provider: No ref. provider found    SUBJECTIVE     Chief Complaint   Patient presents with   • Left Foot - Nail Problem   • Right Foot - Nail Problem     HPI: Patricia Donahue, a 82 y.o.female, comes to clinic.    New, Established, New Problem:  est  Location:  Toenails  Duration:   Greater than five years  Onset:  Gradual  Nature:  sore with palpation.  Stable, worsening, improving:   Stable  Aggravating factors:  Pain with shoe gear and ambulation.  Previous Treatment: Debridement    Medical changes: none.    Patient denies any fevers, chills, nausea, vomiting, shortness of breath, nor any other constitutional signs nor symptoms.       Past Medical History:   Diagnosis Date   • Autoimmune disease (HCC)     unknown name   • Congestive heart failure (CHF) (HCC)    • Hypertension    • Neck pain      History reviewed. No pertinent surgical history.  Family History   Problem Relation Age of Onset   • Hypertension Mother    • Hypertension Father    • Cancer Brother      Social History     Socioeconomic History   • Marital status:    Tobacco Use   • Smoking status: Former     Types: Cigarettes     Quit date: 2020     Years since quitting: 3.0   • Smokeless tobacco: Never   Vaping Use   • Vaping Use: Never used   Substance and Sexual Activity   • Alcohol use: Not Currently   • Drug use: Never   • Sexual activity: Defer     No Known Allergies  Current Outpatient Medications   Medication Sig Dispense Refill   • metoprolol succinate XL (TOPROL-XL) 25 MG 24 hr tablet 25 mg. 1/2 tablet daily     • Aspirin 81 MG capsule Take 81 mg by mouth Daily.     • calcium citrate-vitamin d (CALCITRATE) 315-250 MG-UNIT tablet tablet Take 1 tablet by mouth 2 (two) times a day.     • diphenhydrAMINE (BENADRYL) 25 mg capsule Take 25 mg by mouth Every  Night.     • docusate sodium 100 MG capsule docusate sodium 100 mg oral capsule take 1 capsule (100 mg) by oral route once daily   Active     • esomeprazole (nexIUM) 40 MG capsule Take 40 mg by mouth 2 (Two) Times a Day.     • folic acid (FOLVITE) 1 MG tablet      • furosemide (LASIX) 40 MG tablet 1qd     • hydroxychloroquine (PLAQUENIL) 200 MG tablet      • HYDROXYCHLOROQUINE SULFATE PO Take 200 mg by mouth 2 (two) times a day.     • ibuprofen (ADVIL,MOTRIN) 800 MG tablet Take 800 mg by mouth 2 (Two) Times a Day.     • ketoconazole (NIZORAL) 2 % cream      • levothyroxine (SYNTHROID, LEVOTHROID) 100 MCG tablet Take 1 tablet by mouth Every Morning. 90 tablet 0   • losartan (COZAAR) 25 MG tablet      • methotrexate 2.5 MG tablet      • multivitamin (THERAGRAN) tablet tablet Take 1 tablet by mouth Daily.     • NIFEdipine XL (PROCARDIA XL) 90 MG 24 hr tablet Take 90 mg by mouth Daily.     • Synthroid 50 MCG tablet      • telmisartan (MICARDIS) 40 MG tablet      • Turmeric 500 MG capsule Take 1 tablet by mouth Daily.       No current facility-administered medications for this visit.     Review of Systems   Constitutional: Negative.    Skin:        Painful toenails.   All other systems reviewed and are negative.      OBJECTIVE     Vitals:    01/06/23 1049   BP: (!) 182/80   Pulse: 72   Temp: 97.5 °F (36.4 °C)   SpO2: 99%       Patient seen in no apparent distress.      PHYSICAL EXAM:     Foot/Ankle Exam:       General:   Appearance: elderly    Appearance comment:  Chronically ill  Orientation: AAOx3    Affect: appropriate    Gait: unimpaired    Shoe Gear:  Casual shoes    VASCULAR      Right Foot Vascularity   Normal vascular exam    Dorsalis pedis:  Absent  Posterior tibial:  Absent  Skin Temperature: cool    Edema Grading:  None  CFT:  3  Pedal Hair Growth:  Absent  Varicosities: mild varicosities       Left Foot Vascularity   Normal vascular exam    Dorsalis pedis:  Absent  Posterior tibial:  Absent  Skin Temperature:  cool    Edema Grading:  None  CFT:  3  Pedal Hair Growth:  Absent  Varicosities: mild varicosities        NEUROLOGIC     Right Foot Neurologic   Normal sensation    Light touch sensation:  Normal  Vibratory sensation:  Normal  Hot/Cold sensation: normal    Protective Sensation using [place]-Yamileth Monofilament:  10     Left Foot Neurologic   Normal sensation    Light touch sensation:  Normal  Vibratory sensation:  Normal  Hot/cold sensation: normal    Protective Sensation using [place]-Yamileth Monofilament:  10     MUSCLE STRENGTH     Right Foot Muscle Strength   Foot dorsiflexion:  4  Foot plantar flexion:  4  Foot inversion:  4  Foot eversion:  4     Left Foot Muscle Strength   Foot dorsiflexion:  4  Foot plantar flexion:  4  Foot inversion:  4  Foot eversion:  4     RANGE OF MOTION      Right Foot Range of Motion   Foot and ankle ROM within normal limits       Left Foot Range of Motion   Foot and ankle ROM within normal limits       DERMATOLOGIC     Right Foot Dermatologic   Skin: skin intact    Nails: onychomycosis, abnormally thick, subungual debris, dystrophic nails and ingrown toenail    Nails comment:  Toenails 1, 2, 3, 4, and 5     Left Foot Dermatologic   Skin: skin intact    Nails: onychomycosis, abnormally thick, subungual debris, dystrophic nails and ingrown toenail    Nails comment:  Toenails 1, 2, 3, 4, and 5      ASSESSMENT/PLAN     Diagnoses and all orders for this visit:    1. Foot pain, bilateral (Primary)    2. Onychomycosis    3. Onychocryptosis        Comprehensive lower extremity examination and evaluation was performed.    Discussed findings and treatment plan including risks, benefits, and treatment options with patient in detail. Patient agreed with treatment plan.    Toenails 1, 2, 3, 4, 5 on Right and 1, 2, 3, 4, 5 on Left were debrided with nail nippers then filed with a [place] nail roxanne.  Patient tolerated procedure well without complications.    An After Visit Summary was printed  and given to the patient at discharge, including (if requested) any available informative/educational handouts regarding diagnosis, treatment, or medications. All questions were answered to patient/family satisfaction. Should symptoms fail to improve or worsen they agree to call or return to clinic or to go to the Emergency Department. Discussed the importance of following up with any needed screening tests/labs/specialist appointments and any requested follow-up recommended by me today. Importance of maintaining follow-up discussed and patient accepts that missed appointments can delay diagnosis and potentially lead to worsening of conditions.    Return in about 9 weeks (around 3/10/2023) for Toenail Care., or sooner if acute issues arise.    This document has been electronically signed by Ministerio Wells DPM on January 6, 2023 11:11 EST

## 2023-01-16 ENCOUNTER — TELEPHONE (OUTPATIENT)
Dept: ORTHOPEDIC SURGERY | Facility: CLINIC | Age: 83
End: 2023-01-16

## 2023-01-16 NOTE — TELEPHONE ENCOUNTER
Caller: Patricia Donahue    Relationship to patient: Self    Best call back number: [phone]     Type of visit: FOLLOW UP / RIGHT KNEE / NO NEW INJURY, ONGOING PAIN SINCE PRIOR 04-16-21 DEPO-MEDROL INJECTION (WANTS ANOTHER)     Requested date: ANY DAY ANY TIME     Additional notes: 1ST AVAILABLE w/DR MARTINES OR [facility] -REQUESTING 02-06-23 AT / AROUND 11 AM ('s APPT w/AMBER    PLEASE CALL / LEAVE VMAIL TO DISCUSS SCHEDULING     THANKS

## 2023-01-30 ENCOUNTER — LAB (OUTPATIENT)
Dept: LAB | Facility: HOSPITAL | Age: 83
End: 2023-01-30
Payer: MEDICARE

## 2023-01-30 DIAGNOSIS — Z92.25 STATUS POST RECENT IMMUNOSUPPRESSIVE THERAPY: ICD-10-CM

## 2023-01-30 DIAGNOSIS — Z79.899 ENCOUNTER FOR LONG-TERM (CURRENT) USE OF OTHER MEDICATIONS: ICD-10-CM

## 2023-01-30 DIAGNOSIS — M13.0 POLYARTHROPATHY: ICD-10-CM

## 2023-01-30 LAB
ALBUMIN SERPL-MCNC: 4.3 G/DL (ref 3.5–5.2)
ALBUMIN/GLOB SERPL: 2.3 G/DL
ALP SERPL-CCNC: 86 U/L (ref 39–117)
ALT SERPL W P-5'-P-CCNC: 28 U/L (ref 1–33)
ANION GAP SERPL CALCULATED.3IONS-SCNC: 13.5 MMOL/L (ref 5–15)
AST SERPL-CCNC: 22 U/L (ref 1–32)
BASOPHILS # BLD AUTO: 0.02 10*3/MM3 (ref 0–0.2)
BASOPHILS NFR BLD AUTO: 0.3 % (ref 0–1.5)
BILIRUB SERPL-MCNC: 0.2 MG/DL (ref 0–1.2)
BUN SERPL-MCNC: 10 MG/DL (ref 8–23)
BUN/CREAT SERPL: 12.7 (ref 7–25)
CALCIUM SPEC-SCNC: 9.8 MG/DL (ref 8.6–10.5)
CHLORIDE SERPL-SCNC: 98 MMOL/L (ref 98–107)
CO2 SERPL-SCNC: 26.5 MMOL/L (ref 22–29)
CREAT SERPL-MCNC: 0.79 MG/DL (ref 0.57–1)
CRP SERPL-MCNC: 3.27 MG/DL (ref 0–0.5)
DEPRECATED RDW RBC AUTO: 44.5 FL (ref 37–54)
EGFRCR SERPLBLD CKD-EPI 2021: 75.7 ML/MIN/1.73
EOSINOPHIL # BLD AUTO: 0.13 10*3/MM3 (ref 0–0.4)
EOSINOPHIL NFR BLD AUTO: 1.9 % (ref 0.3–6.2)
ERYTHROCYTE [DISTWIDTH] IN BLOOD BY AUTOMATED COUNT: 13.2 % (ref 12.3–15.4)
GLOBULIN UR ELPH-MCNC: 1.9 GM/DL
GLUCOSE SERPL-MCNC: 90 MG/DL (ref 65–99)
HCT VFR BLD AUTO: 36.8 % (ref 34–46.6)
HGB BLD-MCNC: 12.5 G/DL (ref 12–15.9)
IMM GRANULOCYTES # BLD AUTO: 0.12 10*3/MM3 (ref 0–0.05)
IMM GRANULOCYTES NFR BLD AUTO: 1.8 % (ref 0–0.5)
LYMPHOCYTES # BLD AUTO: 1.08 10*3/MM3 (ref 0.7–3.1)
LYMPHOCYTES NFR BLD AUTO: 15.9 % (ref 19.6–45.3)
MCH RBC QN AUTO: 32.1 PG (ref 26.6–33)
MCHC RBC AUTO-ENTMCNC: 34 G/DL (ref 31.5–35.7)
MCV RBC AUTO: 94.4 FL (ref 79–97)
MONOCYTES # BLD AUTO: 0.62 10*3/MM3 (ref 0.1–0.9)
MONOCYTES NFR BLD AUTO: 9.1 % (ref 5–12)
NEUTROPHILS NFR BLD AUTO: 4.82 10*3/MM3 (ref 1.7–7)
NEUTROPHILS NFR BLD AUTO: 71 % (ref 42.7–76)
NRBC BLD AUTO-RTO: 0 /100 WBC (ref 0–0.2)
PLATELET # BLD AUTO: 285 10*3/MM3 (ref 140–450)
PMV BLD AUTO: 9.6 FL (ref 6–12)
POTASSIUM SERPL-SCNC: 4.3 MMOL/L (ref 3.5–5.2)
PROT SERPL-MCNC: 6.2 G/DL (ref 6–8.5)
RBC # BLD AUTO: 3.9 10*6/MM3 (ref 3.77–5.28)
SODIUM SERPL-SCNC: 138 MMOL/L (ref 136–145)
WBC NRBC COR # BLD: 6.79 10*3/MM3 (ref 3.4–10.8)

## 2023-01-30 PROCEDURE — 36415 COLL VENOUS BLD VENIPUNCTURE: CPT

## 2023-01-30 PROCEDURE — 85025 COMPLETE CBC W/AUTO DIFF WBC: CPT

## 2023-01-30 PROCEDURE — 86140 C-REACTIVE PROTEIN: CPT

## 2023-01-30 PROCEDURE — 80053 COMPREHEN METABOLIC PANEL: CPT

## 2023-02-06 ENCOUNTER — OFFICE VISIT (OUTPATIENT)
Dept: ORTHOPEDIC SURGERY | Facility: CLINIC | Age: 83
End: 2023-02-06
Payer: MEDICARE

## 2023-02-06 VITALS — OXYGEN SATURATION: 97 % | WEIGHT: 140 LBS | HEIGHT: 64 IN | BODY MASS INDEX: 23.9 KG/M2 | HEART RATE: 79 BPM

## 2023-02-06 DIAGNOSIS — M17.11 OSTEOARTHRITIS OF RIGHT KNEE, UNSPECIFIED OSTEOARTHRITIS TYPE: ICD-10-CM

## 2023-02-06 DIAGNOSIS — M25.561 RIGHT KNEE PAIN, UNSPECIFIED CHRONICITY: Primary | ICD-10-CM

## 2023-02-06 PROCEDURE — 20610 DRAIN/INJ JOINT/BURSA W/O US: CPT | Performed by: ORTHOPAEDIC SURGERY

## 2023-02-06 PROCEDURE — 99213 OFFICE O/P EST LOW 20 MIN: CPT | Performed by: ORTHOPAEDIC SURGERY

## 2023-02-06 RX ORDER — LIDOCAINE HYDROCHLORIDE 10 MG/ML
5 INJECTION, SOLUTION INFILTRATION; PERINEURAL
Status: COMPLETED | OUTPATIENT
Start: 2023-02-06 | End: 2023-02-06

## 2023-02-06 RX ORDER — TRIAMCINOLONE ACETONIDE 40 MG/ML
40 INJECTION, SUSPENSION INTRA-ARTICULAR; INTRAMUSCULAR
Status: COMPLETED | OUTPATIENT
Start: 2023-02-06 | End: 2023-02-06

## 2023-02-06 RX ADMIN — LIDOCAINE HYDROCHLORIDE 5 ML: 10 INJECTION, SOLUTION INFILTRATION; PERINEURAL at 11:27

## 2023-02-06 RX ADMIN — TRIAMCINOLONE ACETONIDE 40 MG: 40 INJECTION, SUSPENSION INTRA-ARTICULAR; INTRAMUSCULAR at 11:27

## 2023-02-06 NOTE — PROGRESS NOTES
"Chief Complaint  Initial Evaluation of the Right Knee     Subjective      Patricia Donahue presents to [facility] for initial evaluation of the right knee.  She states it has bothered her for 6 months and has been worse since Beth.  She has had problems off on on for years and has had injections that have helped. She has had no recent injury.     No Known Allergies     Social History     Socioeconomic History   • Marital status:    Tobacco Use   • Smoking status: Former     Types: Cigarettes     Quit date: 2020     Years since quitting: 3.1   • Smokeless tobacco: Never   Vaping Use   • Vaping Use: Never used   Substance and Sexual Activity   • Alcohol use: Not Currently   • Drug use: Never   • Sexual activity: Defer        Review of Systems     Objective   Vital Signs:   Pulse 79   Ht 162.6 cm (64\")   Wt 63.5 kg (140 lb)   SpO2 97%   BMI 24.03 kg/m²       Physical Exam  Constitutional:       Appearance: Normal appearance. Patient is well-developed and normal weight.   HENT:      Head: Normocephalic.      Right Ear: Hearing and external ear normal.      Left Ear: Hearing and external ear normal.      Nose: Nose normal.   Eyes:      Conjunctiva/sclera: Conjunctivae normal.   Cardiovascular:      Rate and Rhythm: Normal rate.   Pulmonary:      Effort: Pulmonary effort is normal.      Breath sounds: No wheezing or rales.   Abdominal:      Palpations: Abdomen is soft.      Tenderness: There is no abdominal tenderness.   Musculoskeletal:      Cervical back: Normal range of motion.   Skin:     Findings: No rash.   Neurological:      Mental Status: Patient  is alert and oriented to person, place, and time.   Psychiatric:         Mood and Affect: Mood and affect normal.         Judgment: Judgment normal.       Ortho Exam      RIGHT KNEE Flexion 130. Extension 0. Stable to varus/valgus stress. Stable to anterior/posterior drawer. Neurovascularly intact. Negative Keyla. Negative " Lachman. Positive EHL, FHL, HS and TA. Sensation intact to light touch all 5 nerves of the foot. Ambulates with Antalgic gait. Patella is well tracking. Calf supple, non-tender. Positive tenderness to the medial joint line. Positive tenderness to the lateral joint line. Positive Crepitus. Good strength to hamstrings, quadriceps, dorsiflexors, and plantar flexors.  Knee Extensor Mechanism intact      Large Joint Arthrocentesis: R knee  Date/Time: 2/6/2023 11:27 AM  Consent given by: patient  Site marked: site marked  Timeout: Immediately prior to procedure a time out was called to verify the correct patient, procedure, equipment, support staff and site/side marked as required   Supporting Documentation  Indications: pain   Procedure Details  Location: knee - R knee  Preparation: Patient was prepped and draped in the usual sterile fashion  Needle size: 22 G  Medications administered: 40 mg triamcinolone acetonide 40 MG/ML; 5 mL lidocaine 1 %  Patient tolerance: patient tolerated the procedure well with no immediate complications              Imaging Results (Most Recent)     Procedure Component Value Units Date/Time    XR Knee 3 View Right [[id number]] Resulted: 02/06/23 1048     Updated: 02/06/23 1051           Result Review :     X-Ray Report:  Right knee X-Ray  Indication: Evaluation of the right knee.   AP/Lateral and Laurium view(s)  Findings: Moderate arthritis. Mild  osseous abnormality, no dislocation or fracture.   Prior studies available for comparison: No            Assessment and Plan     Diagnoses and all orders for this visit:    1. Right knee pain, unspecified chronicity (Primary)  -     XR Knee 3 View Right  -     Large Joint Arthrocentesis: R knee    2. Osteoarthritis of right knee, unspecified osteoarthritis type        Discussed the treatment plan with the patient. Discussed the risks and benefits of conservative treatment. The patient expressed understanding and wished to proceed with a right knee  steroid injection.  She tolerated the injection well.     Call or return if worsening symptoms.    Follow Up     PRN      Patient was given instructions and counseling regarding her condition or for health maintenance advice. Please see specific information pulled into the AVS if appropriate.     Scribed for Julian Baca MD by Vale Fair MA.  02/06/23   11:05 EST      I have personally performed the services described in this document as scribed by the above individual and it is both accurate and complete. Julian Baca MD 02/06/23

## 2023-02-27 ENCOUNTER — LAB (OUTPATIENT)
Dept: LAB | Facility: HOSPITAL | Age: 83
End: 2023-02-27
Payer: MEDICARE

## 2023-02-27 DIAGNOSIS — Z79.899 ENCOUNTER FOR LONG-TERM (CURRENT) USE OF OTHER MEDICATIONS: ICD-10-CM

## 2023-02-27 DIAGNOSIS — M13.0 POLYARTHROPATHY: ICD-10-CM

## 2023-02-27 DIAGNOSIS — Z92.25 STATUS POST RECENT IMMUNOSUPPRESSIVE THERAPY: ICD-10-CM

## 2023-02-27 LAB
BASOPHILS # BLD AUTO: 0.04 10*3/MM3 (ref 0–0.2)
BASOPHILS NFR BLD AUTO: 0.6 % (ref 0–1.5)
DEPRECATED RDW RBC AUTO: 46.4 FL (ref 37–54)
EOSINOPHIL # BLD AUTO: 0.06 10*3/MM3 (ref 0–0.4)
EOSINOPHIL NFR BLD AUTO: 0.9 % (ref 0.3–6.2)
ERYTHROCYTE [DISTWIDTH] IN BLOOD BY AUTOMATED COUNT: 13.6 % (ref 12.3–15.4)
HCT VFR BLD AUTO: 38.7 % (ref 34–46.6)
HGB BLD-MCNC: 13.3 G/DL (ref 12–15.9)
IMM GRANULOCYTES # BLD AUTO: 0.02 10*3/MM3 (ref 0–0.05)
IMM GRANULOCYTES NFR BLD AUTO: 0.3 % (ref 0–0.5)
LYMPHOCYTES # BLD AUTO: 0.98 10*3/MM3 (ref 0.7–3.1)
LYMPHOCYTES NFR BLD AUTO: 14.1 % (ref 19.6–45.3)
MCH RBC QN AUTO: 32 PG (ref 26.6–33)
MCHC RBC AUTO-ENTMCNC: 34.4 G/DL (ref 31.5–35.7)
MCV RBC AUTO: 93 FL (ref 79–97)
MONOCYTES # BLD AUTO: 0.55 10*3/MM3 (ref 0.1–0.9)
MONOCYTES NFR BLD AUTO: 7.9 % (ref 5–12)
NEUTROPHILS NFR BLD AUTO: 5.29 10*3/MM3 (ref 1.7–7)
NEUTROPHILS NFR BLD AUTO: 76.2 % (ref 42.7–76)
NRBC BLD AUTO-RTO: 0 /100 WBC (ref 0–0.2)
PLATELET # BLD AUTO: 230 10*3/MM3 (ref 140–450)
PMV BLD AUTO: 10.2 FL (ref 6–12)
RBC # BLD AUTO: 4.16 10*6/MM3 (ref 3.77–5.28)
WBC NRBC COR # BLD: 6.94 10*3/MM3 (ref 3.4–10.8)

## 2023-02-27 PROCEDURE — 85025 COMPLETE CBC W/AUTO DIFF WBC: CPT

## 2023-02-27 PROCEDURE — 86140 C-REACTIVE PROTEIN: CPT

## 2023-02-27 PROCEDURE — 36415 COLL VENOUS BLD VENIPUNCTURE: CPT

## 2023-02-28 LAB — CRP SERPL-MCNC: <0.3 MG/DL (ref 0–0.5)

## 2023-03-24 ENCOUNTER — LAB (OUTPATIENT)
Dept: LAB | Facility: HOSPITAL | Age: 83
End: 2023-03-24
Payer: MEDICARE

## 2023-03-24 DIAGNOSIS — Z92.25 STATUS POST RECENT IMMUNOSUPPRESSIVE THERAPY: ICD-10-CM

## 2023-03-24 DIAGNOSIS — M13.0 POLYARTHROPATHY: ICD-10-CM

## 2023-03-24 DIAGNOSIS — Z79.899 ENCOUNTER FOR LONG-TERM (CURRENT) USE OF OTHER MEDICATIONS: ICD-10-CM

## 2023-03-24 LAB
ALBUMIN SERPL-MCNC: 4.4 G/DL (ref 3.5–5.2)
ALBUMIN/GLOB SERPL: 1.8 G/DL
ALP SERPL-CCNC: 77 U/L (ref 39–117)
ALT SERPL W P-5'-P-CCNC: 21 U/L (ref 1–33)
ANION GAP SERPL CALCULATED.3IONS-SCNC: 8 MMOL/L (ref 5–15)
AST SERPL-CCNC: 23 U/L (ref 1–32)
BASOPHILS # BLD AUTO: 0.03 10*3/MM3 (ref 0–0.2)
BASOPHILS NFR BLD AUTO: 0.6 % (ref 0–1.5)
BILIRUB SERPL-MCNC: <0.2 MG/DL (ref 0–1.2)
BUN SERPL-MCNC: 17 MG/DL (ref 8–23)
BUN/CREAT SERPL: 21.3 (ref 7–25)
CALCIUM SPEC-SCNC: 9.9 MG/DL (ref 8.6–10.5)
CHLORIDE SERPL-SCNC: 103 MMOL/L (ref 98–107)
CO2 SERPL-SCNC: 30 MMOL/L (ref 22–29)
CREAT SERPL-MCNC: 0.8 MG/DL (ref 0.57–1)
CRP SERPL-MCNC: 0.32 MG/DL (ref 0–0.5)
DEPRECATED RDW RBC AUTO: 45.7 FL (ref 37–54)
EGFRCR SERPLBLD CKD-EPI 2021: 74.6 ML/MIN/1.73
EOSINOPHIL # BLD AUTO: 0.1 10*3/MM3 (ref 0–0.4)
EOSINOPHIL NFR BLD AUTO: 1.9 % (ref 0.3–6.2)
ERYTHROCYTE [DISTWIDTH] IN BLOOD BY AUTOMATED COUNT: 13.7 % (ref 12.3–15.4)
GLOBULIN UR ELPH-MCNC: 2.5 GM/DL
GLUCOSE SERPL-MCNC: 82 MG/DL (ref 65–99)
HCT VFR BLD AUTO: 38.3 % (ref 34–46.6)
HGB BLD-MCNC: 12.9 G/DL (ref 12–15.9)
IMM GRANULOCYTES # BLD AUTO: 0.01 10*3/MM3 (ref 0–0.05)
IMM GRANULOCYTES NFR BLD AUTO: 0.2 % (ref 0–0.5)
LYMPHOCYTES # BLD AUTO: 1.09 10*3/MM3 (ref 0.7–3.1)
LYMPHOCYTES NFR BLD AUTO: 20.8 % (ref 19.6–45.3)
MCH RBC QN AUTO: 31.6 PG (ref 26.6–33)
MCHC RBC AUTO-ENTMCNC: 33.7 G/DL (ref 31.5–35.7)
MCV RBC AUTO: 93.9 FL (ref 79–97)
MONOCYTES # BLD AUTO: 0.53 10*3/MM3 (ref 0.1–0.9)
MONOCYTES NFR BLD AUTO: 10.1 % (ref 5–12)
NEUTROPHILS NFR BLD AUTO: 3.49 10*3/MM3 (ref 1.7–7)
NEUTROPHILS NFR BLD AUTO: 66.4 % (ref 42.7–76)
NRBC BLD AUTO-RTO: 0 /100 WBC (ref 0–0.2)
PLATELET # BLD AUTO: 264 10*3/MM3 (ref 140–450)
PMV BLD AUTO: 10 FL (ref 6–12)
POTASSIUM SERPL-SCNC: 4.5 MMOL/L (ref 3.5–5.2)
PROT SERPL-MCNC: 6.9 G/DL (ref 6–8.5)
RBC # BLD AUTO: 4.08 10*6/MM3 (ref 3.77–5.28)
SODIUM SERPL-SCNC: 141 MMOL/L (ref 136–145)
WBC NRBC COR # BLD: 5.25 10*3/MM3 (ref 3.4–10.8)

## 2023-03-24 PROCEDURE — 36415 COLL VENOUS BLD VENIPUNCTURE: CPT

## 2023-03-24 PROCEDURE — 85025 COMPLETE CBC W/AUTO DIFF WBC: CPT

## 2023-03-24 PROCEDURE — 86140 C-REACTIVE PROTEIN: CPT

## 2023-03-24 PROCEDURE — 80053 COMPREHEN METABOLIC PANEL: CPT

## 2023-06-07 ENCOUNTER — LAB (OUTPATIENT)
Dept: LAB | Facility: HOSPITAL | Age: 83
End: 2023-06-07
Payer: MEDICARE

## 2023-06-07 ENCOUNTER — TRANSCRIBE ORDERS (OUTPATIENT)
Dept: ADMINISTRATIVE | Facility: HOSPITAL | Age: 83
End: 2023-06-07
Payer: MEDICARE

## 2023-06-07 DIAGNOSIS — R79.82 ELEVATED C-REACTIVE PROTEIN (CRP): ICD-10-CM

## 2023-06-07 DIAGNOSIS — Z79.899 ENCOUNTER FOR LONG-TERM (CURRENT) USE OF OTHER MEDICATIONS: ICD-10-CM

## 2023-06-07 DIAGNOSIS — M13.0 POLYARTHROPATHY: Primary | ICD-10-CM

## 2023-06-07 DIAGNOSIS — R70.0 ELEVATED SEDIMENTATION RATE: ICD-10-CM

## 2023-06-07 DIAGNOSIS — M13.0 POLYARTHROPATHY: ICD-10-CM

## 2023-06-07 LAB
ALBUMIN SERPL-MCNC: 4.1 G/DL (ref 3.5–5.2)
ALP SERPL-CCNC: 83 U/L (ref 39–117)
ALT SERPL W P-5'-P-CCNC: 15 U/L (ref 1–33)
AST SERPL-CCNC: 20 U/L (ref 1–32)
BASOPHILS # BLD AUTO: 0.03 10*3/MM3 (ref 0–0.2)
BASOPHILS NFR BLD AUTO: 0.6 % (ref 0–1.5)
BILIRUB CONJ SERPL-MCNC: <0.2 MG/DL (ref 0–0.3)
BILIRUB INDIRECT SERPL-MCNC: NORMAL MG/DL
BILIRUB SERPL-MCNC: <0.2 MG/DL (ref 0–1.2)
CRP SERPL-MCNC: 2.02 MG/DL (ref 0–0.5)
DEPRECATED RDW RBC AUTO: 41.8 FL (ref 37–54)
EOSINOPHIL # BLD AUTO: 0.1 10*3/MM3 (ref 0–0.4)
EOSINOPHIL NFR BLD AUTO: 2 % (ref 0.3–6.2)
ERYTHROCYTE [DISTWIDTH] IN BLOOD BY AUTOMATED COUNT: 12.2 % (ref 12.3–15.4)
HCT VFR BLD AUTO: 37.5 % (ref 34–46.6)
HGB BLD-MCNC: 12.7 G/DL (ref 12–15.9)
IMM GRANULOCYTES # BLD AUTO: 0.01 10*3/MM3 (ref 0–0.05)
IMM GRANULOCYTES NFR BLD AUTO: 0.2 % (ref 0–0.5)
LYMPHOCYTES # BLD AUTO: 1.02 10*3/MM3 (ref 0.7–3.1)
LYMPHOCYTES NFR BLD AUTO: 20.6 % (ref 19.6–45.3)
MCH RBC QN AUTO: 32 PG (ref 26.6–33)
MCHC RBC AUTO-ENTMCNC: 33.9 G/DL (ref 31.5–35.7)
MCV RBC AUTO: 94.5 FL (ref 79–97)
MONOCYTES # BLD AUTO: 0.59 10*3/MM3 (ref 0.1–0.9)
MONOCYTES NFR BLD AUTO: 11.9 % (ref 5–12)
NEUTROPHILS NFR BLD AUTO: 3.2 10*3/MM3 (ref 1.7–7)
NEUTROPHILS NFR BLD AUTO: 64.7 % (ref 42.7–76)
NRBC BLD AUTO-RTO: 0 /100 WBC (ref 0–0.2)
PLATELET # BLD AUTO: 249 10*3/MM3 (ref 140–450)
PMV BLD AUTO: 10.4 FL (ref 6–12)
PROT SERPL-MCNC: 6.7 G/DL (ref 6–8.5)
RBC # BLD AUTO: 3.97 10*6/MM3 (ref 3.77–5.28)
WBC NRBC COR # BLD: 4.95 10*3/MM3 (ref 3.4–10.8)

## 2023-06-07 PROCEDURE — 85025 COMPLETE CBC W/AUTO DIFF WBC: CPT

## 2023-06-07 PROCEDURE — 86140 C-REACTIVE PROTEIN: CPT

## 2023-06-07 PROCEDURE — 80076 HEPATIC FUNCTION PANEL: CPT

## 2023-06-07 PROCEDURE — 36415 COLL VENOUS BLD VENIPUNCTURE: CPT

## 2023-08-08 ENCOUNTER — TRANSCRIBE ORDERS (OUTPATIENT)
Dept: LAB | Facility: HOSPITAL | Age: 83
End: 2023-08-08
Payer: MEDICARE

## 2023-08-08 ENCOUNTER — LAB (OUTPATIENT)
Dept: LAB | Facility: HOSPITAL | Age: 83
End: 2023-08-08
Payer: MEDICARE

## 2023-08-08 ENCOUNTER — HOSPITAL ENCOUNTER (OUTPATIENT)
Dept: GENERAL RADIOLOGY | Facility: HOSPITAL | Age: 83
Discharge: HOME | End: 2023-08-08
Payer: MEDICARE

## 2023-08-08 DIAGNOSIS — R79.82 ELEVATED C-REACTIVE PROTEIN (CRP): ICD-10-CM

## 2023-08-08 DIAGNOSIS — M13.0 POLYARTHROPATHY: ICD-10-CM

## 2023-08-08 DIAGNOSIS — Z79.899 ENCOUNTER FOR LONG-TERM (CURRENT) USE OF OTHER MEDICATIONS: ICD-10-CM

## 2023-08-08 DIAGNOSIS — M25.552 PAIN OF BOTH HIP JOINTS: ICD-10-CM

## 2023-08-08 DIAGNOSIS — M25.551 PAIN OF BOTH HIP JOINTS: ICD-10-CM

## 2023-08-08 DIAGNOSIS — M25.552 PAIN OF BOTH HIP JOINTS: Primary | ICD-10-CM

## 2023-08-08 DIAGNOSIS — M25.551 PAIN OF BOTH HIP JOINTS: Primary | ICD-10-CM

## 2023-08-08 LAB
BASOPHILS # BLD AUTO: 0.03 10*3/MM3 (ref 0–0.2)
BASOPHILS NFR BLD AUTO: 0.6 % (ref 0–1.5)
DEPRECATED RDW RBC AUTO: 43.8 FL (ref 37–54)
EOSINOPHIL # BLD AUTO: 0.14 10*3/MM3 (ref 0–0.4)
EOSINOPHIL NFR BLD AUTO: 2.8 % (ref 0.3–6.2)
ERYTHROCYTE [DISTWIDTH] IN BLOOD BY AUTOMATED COUNT: 12.7 % (ref 12.3–15.4)
HCT VFR BLD AUTO: 36.9 % (ref 34–46.6)
HGB BLD-MCNC: 12.4 G/DL (ref 12–15.9)
IMM GRANULOCYTES # BLD AUTO: 0.02 10*3/MM3 (ref 0–0.05)
IMM GRANULOCYTES NFR BLD AUTO: 0.4 % (ref 0–0.5)
LYMPHOCYTES # BLD AUTO: 1.03 10*3/MM3 (ref 0.7–3.1)
LYMPHOCYTES NFR BLD AUTO: 20.8 % (ref 19.6–45.3)
MCH RBC QN AUTO: 32.2 PG (ref 26.6–33)
MCHC RBC AUTO-ENTMCNC: 33.6 G/DL (ref 31.5–35.7)
MCV RBC AUTO: 95.8 FL (ref 79–97)
MONOCYTES # BLD AUTO: 0.47 10*3/MM3 (ref 0.1–0.9)
MONOCYTES NFR BLD AUTO: 9.5 % (ref 5–12)
NEUTROPHILS NFR BLD AUTO: 3.27 10*3/MM3 (ref 1.7–7)
NEUTROPHILS NFR BLD AUTO: 65.9 % (ref 42.7–76)
NRBC BLD AUTO-RTO: 0 /100 WBC (ref 0–0.2)
PLATELET # BLD AUTO: 231 10*3/MM3 (ref 140–450)
PMV BLD AUTO: 9.9 FL (ref 6–12)
RBC # BLD AUTO: 3.85 10*6/MM3 (ref 3.77–5.28)
WBC NRBC COR # BLD: 4.96 10*3/MM3 (ref 3.4–10.8)

## 2023-08-08 PROCEDURE — 85025 COMPLETE CBC W/AUTO DIFF WBC: CPT

## 2023-08-08 PROCEDURE — 80076 HEPATIC FUNCTION PANEL: CPT

## 2023-08-08 PROCEDURE — 36415 COLL VENOUS BLD VENIPUNCTURE: CPT

## 2023-08-08 PROCEDURE — 86140 C-REACTIVE PROTEIN: CPT

## 2023-08-08 PROCEDURE — 73521 X-RAY EXAM HIPS BI 2 VIEWS: CPT

## 2023-08-09 LAB
ALBUMIN SERPL-MCNC: 4.2 G/DL (ref 3.5–5.2)
ALP SERPL-CCNC: 82 U/L (ref 39–117)
ALT SERPL W P-5'-P-CCNC: 18 U/L (ref 1–33)
AST SERPL-CCNC: 22 U/L (ref 1–32)
BILIRUB CONJ SERPL-MCNC: <0.2 MG/DL (ref 0–0.3)
BILIRUB INDIRECT SERPL-MCNC: NORMAL MG/DL
BILIRUB SERPL-MCNC: <0.2 MG/DL (ref 0–1.2)
CRP SERPL-MCNC: 0.38 MG/DL (ref 0–0.5)
PROT SERPL-MCNC: 6.3 G/DL (ref 6–8.5)

## 2023-09-18 ENCOUNTER — TRANSCRIBE ORDERS (OUTPATIENT)
Dept: ADMINISTRATIVE | Facility: HOSPITAL | Age: 83
End: 2023-09-18
Payer: MEDICARE

## 2023-09-18 DIAGNOSIS — Z12.31 SCREENING MAMMOGRAM FOR HIGH-RISK PATIENT: Primary | ICD-10-CM

## 2023-12-22 DIAGNOSIS — Z86.73 HISTORY OF TIA (TRANSIENT ISCHEMIC ATTACK): Primary | ICD-10-CM

## 2023-12-22 RX ORDER — MODAFINIL 100 MG/1
100 TABLET ORAL DAILY
Qty: 90 TABLET | Refills: 3 | Status: SHIPPED | OUTPATIENT
Start: 2023-12-22

## 2023-12-26 ENCOUNTER — NURSING HOME (OUTPATIENT)
Dept: INTERNAL MEDICINE | Facility: CLINIC | Age: 83
End: 2023-12-26
Payer: MEDICARE

## 2023-12-26 DIAGNOSIS — Z72.0 TOBACCO ABUSE DISORDER: ICD-10-CM

## 2023-12-26 DIAGNOSIS — E03.8 HYPOTHYROIDISM DUE TO HASHIMOTO'S THYROIDITIS: ICD-10-CM

## 2023-12-26 DIAGNOSIS — F33.1 MAJOR DEPRESSIVE DISORDER, RECURRENT, MODERATE: ICD-10-CM

## 2023-12-26 DIAGNOSIS — I10 PRIMARY HYPERTENSION: ICD-10-CM

## 2023-12-26 DIAGNOSIS — I63.02 STROKE DUE TO THROMBOSIS OF BASILAR ARTERY: Primary | ICD-10-CM

## 2023-12-26 DIAGNOSIS — E78.2 MIXED HYPERLIPIDEMIA: ICD-10-CM

## 2023-12-26 DIAGNOSIS — Z86.73 HISTORY OF TIA (TRANSIENT ISCHEMIC ATTACK): ICD-10-CM

## 2023-12-26 DIAGNOSIS — I77.9 CAROTID ARTERY DISEASE, UNSPECIFIED LATERALITY, UNSPECIFIED TYPE: ICD-10-CM

## 2023-12-26 DIAGNOSIS — E06.3 HYPOTHYROIDISM DUE TO HASHIMOTO'S THYROIDITIS: ICD-10-CM

## 2023-12-26 NOTE — PROGRESS NOTES
Nursing Home History and Physical Note      Ayaz Ferrari MD  [x]  [address], [address]  [place] [address]  Phone: [phone]  Fax: [phone]     PATIENT NAME: Patricia Donahue                                                                          YOB: 1940            DATE OF SERVICE: 12/26/2023  FACILITY:   [] [facility]   [x] [facility]  [] [facility]  [] Other      HISTORY OF PRESENT ILLNESS: Patient is a pleasant 83-year-old female who is nonverbal with advanced dementia, she is not able to give me a history, she is in bed she has a Evans catheter G-tube in place, she does moan a few things but it does not actually verbiage, she has left upper and lower extremity hemiplegia, her hospital course, show she was at a hospital in [place] around December 18, 2023, she had basilar artery thrombosis, and ended up with a PEG tube placement around that time for nutritional support, she was on a heparin drip,      PAST MEDICAL & SURGICAL HISTORY:   Past Medical History:   Diagnosis Date    Autoimmune disease     unknown name    Congestive heart failure (CHF)     Hypertension     Neck pain    Cardiomyopathy  Hyperlipidemia  Hypothyroidism  Rheumatoid arthritis  TIAs    No past surgical history on file.     Previous carotid endarterectomy breast lumpectomy hysterectomy and partial thyroidectomy    MEDICATIONS:  I have reviewed and reconciled the patients medication list in the patients chart at the skilled nursing facility today.      ALLERGIES:  No Known Allergies      SOCIAL HISTORY:  Social History     Socioeconomic History    Marital status:    Tobacco Use    Smoking status: Former     Types: Cigarettes     Quit date: 2020     Years since quitting: 3.9    Smokeless tobacco: Never   Vaping Use    Vaping Use: Never used   Substance and Sexual Activity    Alcohol use: Not Currently    Drug use: Never     Sexual activity: Defer       FAMILY HISTORY:  Family History   Problem Relation Age of Onset    Hypertension Mother     Hypertension Father     Cancer Brother          PHYSICAL EXAMINATION:   VITAL SIGNS: 142/92, sat 94% on room air on admission pulse of 84 respiratory rate 17 temperature 97.9    PHYSICAL EXAM: She is nonverbal her eyes are closed, she moans a little bit, her neck is supple her abdomen slightly protuberant soft nontender G-tube is intact lung fields are clear with diminished breath sounds cardiac exam regular rhythm without appreciable murmur her lower legs showed no edema she has a bandage over the left lateral lower leg anterior tib region, her Evans catheter is in place, she has left upper extremity hemiplegia she cannot move or even respond to my questioning with responses with her left arm or leg,  She does have a couple Band-Aids on the left antecubital and left hand consistent with recent blood draws    RECORDS REVIEW:   Orders Reviewed.  Labs Reviewed.      ICD-10-CM ICD-9-CM   1. 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 Mixed hyperlipidemia  E78.2 272.2   3. Hypothyroidism due to Hashimoto's thyroiditis  E03.8 244.8    E06.3 245.2   4. Primary hypertension  I10 401.9   5. History of TIA (transient ischemic attack)  Z86.73 V12.54   6. Carotid artery disease, unspecified laterality, unspecified type  I77.9 447.9   7. Major depressive disorder, recurrent, moderate  F33.1 296.32   8. Tobacco abuse disorder  Z72.0 305.1       ASSESSMENT/PLAN    Diagnoses and all orders for this visit:    1. 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 Mixed hyperlipidemia    3. Hypothyroidism due to Hashimoto's thyroiditis    4. Primary hypertension    5. History of TIA (transient ischemic attack)    6. Carotid artery disease, unspecified laterality, unspecified type    7. Major depressive disorder, recurrent, moderate    8. Tobacco abuse disorder       Basilar artery  thrombosis stroke, left upper and lower extremity hemiplegia, currently nonverbal with encephalopathy.,  Continues Eliquis 5 mg twice a day, aspirin 81 mg daily continues modafinil 100 mg once a day    Depression continues fluoxetine 20 mg daily    Hypothyroidism continues levothyroxine 50 mcg daily    Hypertension continues lisinopril 10 mg 1-1/2 tablets daily, metoprolol 12.5 mg once a day    Constipation continues MiraLAX, Senokot-S    Nicotine patch continues due to tobacco abuse    Hyperlipidemia continues rosuvastatin 2.5 mg daily    Plan is to check lab work today December 26, 2023, continue PEG tube feeding support, therapy evaluations, questionable need for continued nicotine patch, modafinil?,            Ayaz Ferrari MD

## 2024-01-01 ENCOUNTER — APPOINTMENT (OUTPATIENT)
Dept: GENERAL RADIOLOGY | Facility: HOSPITAL | Age: 84
End: 2024-01-01
Payer: MEDICARE

## 2024-01-01 ENCOUNTER — APPOINTMENT (OUTPATIENT)
Dept: CT IMAGING | Facility: HOSPITAL | Age: 84
End: 2024-01-01
Payer: MEDICARE

## 2024-01-01 ENCOUNTER — HOSPITAL ENCOUNTER (EMERGENCY)
Facility: HOSPITAL | Age: 84
End: 2024-01-11
Attending: EMERGENCY MEDICINE | Admitting: FAMILY MEDICINE
Payer: MEDICARE

## 2024-01-01 VITALS
SYSTOLIC BLOOD PRESSURE: 144 MMHG | BODY MASS INDEX: 26.26 KG/M2 | WEIGHT: 153 LBS | TEMPERATURE: 101.3 F | HEART RATE: 66 BPM | RESPIRATION RATE: 22 BRPM | DIASTOLIC BLOOD PRESSURE: 68 MMHG | OXYGEN SATURATION: 100 %

## 2024-01-01 DIAGNOSIS — I62.9 INTRACRANIAL BLEED: Primary | ICD-10-CM

## 2024-01-01 LAB
ALBUMIN SERPL-MCNC: 3.3 G/DL (ref 3.5–5.2)
ALBUMIN/GLOB SERPL: 1.1 G/DL
ALP SERPL-CCNC: 96 U/L (ref 39–117)
ALT SERPL W P-5'-P-CCNC: 34 U/L (ref 1–33)
ANION GAP SERPL CALCULATED.3IONS-SCNC: 13.2 MMOL/L (ref 5–15)
ARTERIAL PATENCY WRIST A: POSITIVE
AST SERPL-CCNC: 36 U/L (ref 1–32)
BACTERIA UR QL AUTO: ABNORMAL /HPF
BASE EXCESS BLDA CALC-SCNC: -7.7 MMOL/L (ref -2–2)
BASOPHILS # BLD AUTO: 0.07 10*3/MM3 (ref 0–0.2)
BASOPHILS NFR BLD AUTO: 0.5 % (ref 0–1.5)
BDY SITE: ABNORMAL
BILIRUB SERPL-MCNC: 0.2 MG/DL (ref 0–1.2)
BILIRUB UR QL STRIP: NEGATIVE
BUN SERPL-MCNC: 19 MG/DL (ref 8–23)
BUN/CREAT SERPL: 22.6 (ref 7–25)
CA-I BLDA-SCNC: 1.2 MMOL/L (ref 1.13–1.32)
CALCIUM SPEC-SCNC: 8.7 MG/DL (ref 8.6–10.5)
CHLORIDE BLDA-SCNC: 105 MMOL/L (ref 98–106)
CHLORIDE SERPL-SCNC: 105 MMOL/L (ref 98–107)
CLARITY UR: ABNORMAL
CO2 SERPL-SCNC: 23.8 MMOL/L (ref 22–29)
COHGB MFR BLD: 0.1 % (ref 0–1.5)
COLOR UR: YELLOW
CREAT SERPL-MCNC: 0.84 MG/DL (ref 0.57–1)
D-LACTATE SERPL-SCNC: 3.7 MMOL/L (ref 0.5–2)
DEPRECATED RDW RBC AUTO: 50.7 FL (ref 37–54)
EGFRCR SERPLBLD CKD-EPI 2021: 69.9 ML/MIN/1.73
EOSINOPHIL # BLD AUTO: 0.41 10*3/MM3 (ref 0–0.4)
EOSINOPHIL NFR BLD AUTO: 2.8 % (ref 0.3–6.2)
ERYTHROCYTE [DISTWIDTH] IN BLOOD BY AUTOMATED COUNT: 13.4 % (ref 12.3–15.4)
FHHB: 4 % (ref 0–5)
FLUAV SUBTYP SPEC NAA+PROBE: NOT DETECTED
FLUBV RNA ISLT QL NAA+PROBE: NOT DETECTED
GAS FLOW AIRWAY: ABNORMAL L/MIN
GLOBULIN UR ELPH-MCNC: 3.1 GM/DL
GLUCOSE BLDA-MCNC: 198 MG/DL (ref 65–99)
GLUCOSE SERPL-MCNC: 222 MG/DL (ref 65–99)
GLUCOSE UR STRIP-MCNC: NEGATIVE MG/DL
HCO3 BLDA-SCNC: 20.4 MMOL/L (ref 22–26)
HCT VFR BLD AUTO: 36.8 % (ref 34–46.6)
HGB BLD-MCNC: 11.2 G/DL (ref 12–15.9)
HGB BLDA-MCNC: 12.3 G/DL (ref 11.7–14.6)
HGB UR QL STRIP.AUTO: ABNORMAL
HOLD SPECIMEN: NORMAL
HOLD SPECIMEN: NORMAL
HYALINE CASTS UR QL AUTO: ABNORMAL /LPF
IMM GRANULOCYTES # BLD AUTO: 0.26 10*3/MM3 (ref 0–0.05)
IMM GRANULOCYTES NFR BLD AUTO: 1.8 % (ref 0–0.5)
INHALED O2 CONCENTRATION: 60 %
KETONES UR QL STRIP: NEGATIVE
LACTATE BLDA-SCNC: 2.77 MMOL/L (ref 0.5–2)
LEUKOCYTE ESTERASE UR QL STRIP.AUTO: ABNORMAL
LYMPHOCYTES # BLD AUTO: 3.3 10*3/MM3 (ref 0.7–3.1)
LYMPHOCYTES NFR BLD AUTO: 22.7 % (ref 19.6–45.3)
MCH RBC QN AUTO: 31.2 PG (ref 26.6–33)
MCHC RBC AUTO-ENTMCNC: 30.4 G/DL (ref 31.5–35.7)
MCV RBC AUTO: 102.5 FL (ref 79–97)
METHGB BLD QL: 0.3 % (ref 0–1.5)
MODALITY: ABNORMAL
MONOCYTES # BLD AUTO: 1.19 10*3/MM3 (ref 0.1–0.9)
MONOCYTES NFR BLD AUTO: 8.2 % (ref 5–12)
NEUTROPHILS NFR BLD AUTO: 64 % (ref 42.7–76)
NEUTROPHILS NFR BLD AUTO: 9.29 10*3/MM3 (ref 1.7–7)
NITRITE UR QL STRIP: POSITIVE
NOTE: ABNORMAL
NRBC BLD AUTO-RTO: 0 /100 WBC (ref 0–0.2)
NT-PROBNP SERPL-MCNC: 2137 PG/ML (ref 0–1800)
OXYHGB MFR BLDV: 95.6 % (ref 94–99)
PCO2 BLDA: 52.3 MM HG (ref 35–45)
PH BLDA: 7.21 PH UNITS (ref 7.35–7.45)
PH UR STRIP.AUTO: 7.5 [PH] (ref 5–8)
PLATELET # BLD AUTO: 244 10*3/MM3 (ref 140–450)
PMV BLD AUTO: 10.7 FL (ref 6–12)
PO2 BLD: 173 MM[HG] (ref 0–500)
PO2 BLDA: 104 MM HG (ref 80–100)
POTASSIUM BLDA-SCNC: 3.72 MMOL/L (ref 3.5–5)
POTASSIUM SERPL-SCNC: 3.5 MMOL/L (ref 3.5–5.2)
PROT SERPL-MCNC: 6.4 G/DL (ref 6–8.5)
PROT UR QL STRIP: ABNORMAL
QT INTERVAL: 410 MS
QTC INTERVAL: 517 MS
RBC # BLD AUTO: 3.59 10*6/MM3 (ref 3.77–5.28)
RBC # UR STRIP: ABNORMAL /HPF
REF LAB TEST METHOD: ABNORMAL
RSV RNA NPH QL NAA+NON-PROBE: NOT DETECTED
SAO2 % BLDCOA: 96 % (ref 95–99)
SARS-COV-2 RNA RESP QL NAA+PROBE: NOT DETECTED
SODIUM BLDA-SCNC: 142.5 MMOL/L (ref 136–146)
SODIUM SERPL-SCNC: 142 MMOL/L (ref 136–145)
SP GR UR STRIP: 1.02 (ref 1–1.03)
SQUAMOUS #/AREA URNS HPF: ABNORMAL /HPF
TROPONIN T SERPL HS-MCNC: 31 NG/L
UROBILINOGEN UR QL STRIP: ABNORMAL
WBC # UR STRIP: ABNORMAL /HPF
WBC NRBC COR # BLD AUTO: 14.52 10*3/MM3 (ref 3.4–10.8)
WHOLE BLOOD HOLD COAG: NORMAL
WHOLE BLOOD HOLD SPECIMEN: NORMAL

## 2024-01-01 PROCEDURE — G0378 HOSPITAL OBSERVATION PER HR: HCPCS

## 2024-01-01 PROCEDURE — 87147 CULTURE TYPE IMMUNOLOGIC: CPT | Performed by: EMERGENCY MEDICINE

## 2024-01-01 PROCEDURE — 83605 ASSAY OF LACTIC ACID: CPT | Performed by: EMERGENCY MEDICINE

## 2024-01-01 PROCEDURE — 82375 ASSAY CARBOXYHB QUANT: CPT

## 2024-01-01 PROCEDURE — 94799 UNLISTED PULMONARY SVC/PX: CPT

## 2024-01-01 PROCEDURE — 25810000003 SODIUM CHLORIDE 0.9 % SOLUTION: Performed by: EMERGENCY MEDICINE

## 2024-01-01 PROCEDURE — 94002 VENT MGMT INPAT INIT DAY: CPT

## 2024-01-01 PROCEDURE — 36600 WITHDRAWAL OF ARTERIAL BLOOD: CPT

## 2024-01-01 PROCEDURE — 83880 ASSAY OF NATRIURETIC PEPTIDE: CPT | Performed by: EMERGENCY MEDICINE

## 2024-01-01 PROCEDURE — 81001 URINALYSIS AUTO W/SCOPE: CPT | Performed by: EMERGENCY MEDICINE

## 2024-01-01 PROCEDURE — 96365 THER/PROPH/DIAG IV INF INIT: CPT

## 2024-01-01 PROCEDURE — 36415 COLL VENOUS BLD VENIPUNCTURE: CPT

## 2024-01-01 PROCEDURE — 87205 SMEAR GRAM STAIN: CPT | Performed by: EMERGENCY MEDICINE

## 2024-01-01 PROCEDURE — 80053 COMPREHEN METABOLIC PANEL: CPT | Performed by: EMERGENCY MEDICINE

## 2024-01-01 PROCEDURE — 85025 COMPLETE CBC W/AUTO DIFF WBC: CPT | Performed by: EMERGENCY MEDICINE

## 2024-01-01 PROCEDURE — 87086 URINE CULTURE/COLONY COUNT: CPT | Performed by: EMERGENCY MEDICINE

## 2024-01-01 PROCEDURE — 87070 CULTURE OTHR SPECIMN AEROBIC: CPT | Performed by: EMERGENCY MEDICINE

## 2024-01-01 PROCEDURE — 82805 BLOOD GASES W/O2 SATURATION: CPT

## 2024-01-01 PROCEDURE — 96366 THER/PROPH/DIAG IV INF ADDON: CPT

## 2024-01-01 PROCEDURE — 70450 CT HEAD/BRAIN W/O DYE: CPT

## 2024-01-01 PROCEDURE — P9612 CATHETERIZE FOR URINE SPEC: HCPCS

## 2024-01-01 PROCEDURE — 87637 SARSCOV2&INF A&B&RSV AMP PRB: CPT | Performed by: EMERGENCY MEDICINE

## 2024-01-01 PROCEDURE — 93005 ELECTROCARDIOGRAM TRACING: CPT | Performed by: EMERGENCY MEDICINE

## 2024-01-01 PROCEDURE — 84484 ASSAY OF TROPONIN QUANT: CPT | Performed by: EMERGENCY MEDICINE

## 2024-01-01 PROCEDURE — 99222 1ST HOSP IP/OBS MODERATE 55: CPT | Performed by: FAMILY MEDICINE

## 2024-01-01 PROCEDURE — 87040 BLOOD CULTURE FOR BACTERIA: CPT | Performed by: EMERGENCY MEDICINE

## 2024-01-01 PROCEDURE — 99285 EMERGENCY DEPT VISIT HI MDM: CPT

## 2024-01-01 PROCEDURE — 87088 URINE BACTERIA CULTURE: CPT | Performed by: EMERGENCY MEDICINE

## 2024-01-01 PROCEDURE — 71045 X-RAY EXAM CHEST 1 VIEW: CPT

## 2024-01-01 PROCEDURE — 87186 SC STD MICRODIL/AGAR DIL: CPT | Performed by: EMERGENCY MEDICINE

## 2024-01-01 PROCEDURE — 83050 HGB METHEMOGLOBIN QUAN: CPT

## 2024-01-01 PROCEDURE — 96368 THER/DIAG CONCURRENT INF: CPT

## 2024-01-01 PROCEDURE — 25010000002 CEFEPIME PER 500 MG: Performed by: EMERGENCY MEDICINE

## 2024-01-01 PROCEDURE — 93005 ELECTROCARDIOGRAM TRACING: CPT

## 2024-01-01 RX ORDER — LIDOCAINE HYDROCHLORIDE 20 MG/ML
5 SOLUTION OROPHARYNGEAL EVERY 4 HOURS PRN
Status: DISCONTINUED | OUTPATIENT
Start: 2024-01-01 | End: 2024-01-01 | Stop reason: HOSPADM

## 2024-01-01 RX ORDER — ACETAMINOPHEN 325 MG/1
650 TABLET ORAL EVERY 4 HOURS PRN
Status: DISCONTINUED | OUTPATIENT
Start: 2024-01-01 | End: 2024-01-01 | Stop reason: HOSPADM

## 2024-01-01 RX ORDER — LORAZEPAM 2 MG/ML
1 CONCENTRATE ORAL
Status: DISCONTINUED | OUTPATIENT
Start: 2024-01-01 | End: 2024-01-01 | Stop reason: HOSPADM

## 2024-01-01 RX ORDER — DIAZEPAM 5 MG/ML
2.5 INJECTION, SOLUTION INTRAMUSCULAR; INTRAVENOUS
Status: DISCONTINUED | OUTPATIENT
Start: 2024-01-01 | End: 2024-01-01 | Stop reason: HOSPADM

## 2024-01-01 RX ORDER — LORAZEPAM 2 MG/ML
2 CONCENTRATE ORAL
Status: DISCONTINUED | OUTPATIENT
Start: 2024-01-01 | End: 2024-01-01 | Stop reason: HOSPADM

## 2024-01-01 RX ORDER — LORAZEPAM 2 MG/ML
0.5 CONCENTRATE ORAL
Status: DISCONTINUED | OUTPATIENT
Start: 2024-01-01 | End: 2024-01-01 | Stop reason: HOSPADM

## 2024-01-01 RX ORDER — LORAZEPAM 0.5 MG/1
1 TABLET ORAL
Status: DISCONTINUED | OUTPATIENT
Start: 2024-01-01 | End: 2024-01-01 | Stop reason: HOSPADM

## 2024-01-01 RX ORDER — ACETAMINOPHEN 160 MG/5ML
650 SOLUTION ORAL EVERY 4 HOURS PRN
Status: DISCONTINUED | OUTPATIENT
Start: 2024-01-01 | End: 2024-01-01 | Stop reason: HOSPADM

## 2024-01-01 RX ORDER — NOREPINEPHRINE BITARTRATE 0.03 MG/ML
.02-.3 INJECTION, SOLUTION INTRAVENOUS
Status: DISCONTINUED | OUTPATIENT
Start: 2024-01-01 | End: 2024-01-01 | Stop reason: HOSPADM

## 2024-01-01 RX ORDER — DIAZEPAM 5 MG/ML
10 INJECTION, SOLUTION INTRAMUSCULAR; INTRAVENOUS
Status: DISCONTINUED | OUTPATIENT
Start: 2024-01-01 | End: 2024-01-01 | Stop reason: HOSPADM

## 2024-01-01 RX ORDER — DIAZEPAM 5 MG/ML
5 INJECTION, SOLUTION INTRAMUSCULAR; INTRAVENOUS
Status: DISCONTINUED | OUTPATIENT
Start: 2024-01-01 | End: 2024-01-01 | Stop reason: HOSPADM

## 2024-01-01 RX ORDER — LORAZEPAM 0.5 MG/1
0.5 TABLET ORAL
Status: DISCONTINUED | OUTPATIENT
Start: 2024-01-01 | End: 2024-01-01 | Stop reason: HOSPADM

## 2024-01-01 RX ORDER — ACETAMINOPHEN 650 MG/1
650 SUPPOSITORY RECTAL ONCE
Status: COMPLETED | OUTPATIENT
Start: 2024-01-01 | End: 2024-01-01

## 2024-01-01 RX ORDER — LORAZEPAM 0.5 MG/1
2 TABLET ORAL
Status: DISCONTINUED | OUTPATIENT
Start: 2024-01-01 | End: 2024-01-01 | Stop reason: HOSPADM

## 2024-01-01 RX ORDER — DIPHENOXYLATE HYDROCHLORIDE AND ATROPINE SULFATE 2.5; .025 MG/1; MG/1
1 TABLET ORAL
Status: DISCONTINUED | OUTPATIENT
Start: 2024-01-01 | End: 2024-01-01 | Stop reason: HOSPADM

## 2024-01-01 RX ORDER — MORPHINE SULFATE 20 MG/ML
10 SOLUTION ORAL
Status: DISCONTINUED | OUTPATIENT
Start: 2024-01-01 | End: 2024-01-01 | Stop reason: HOSPADM

## 2024-01-01 RX ORDER — SODIUM CHLORIDE 0.9 % (FLUSH) 0.9 %
10 SYRINGE (ML) INJECTION AS NEEDED
Status: DISCONTINUED | OUTPATIENT
Start: 2024-01-01 | End: 2024-01-01 | Stop reason: HOSPADM

## 2024-01-01 RX ORDER — VANCOMYCIN/0.9 % SOD CHLORIDE 1.5G/250ML
20 PLASTIC BAG, INJECTION (ML) INTRAVENOUS ONCE
Status: DISCONTINUED | OUTPATIENT
Start: 2024-01-01 | End: 2024-01-01

## 2024-01-01 RX ORDER — ACETAMINOPHEN 650 MG/1
650 SUPPOSITORY RECTAL EVERY 4 HOURS PRN
Status: DISCONTINUED | OUTPATIENT
Start: 2024-01-01 | End: 2024-01-01 | Stop reason: HOSPADM

## 2024-01-01 RX ADMIN — SODIUM CHLORIDE 1000 ML: 9 INJECTION, SOLUTION INTRAVENOUS at 01:42

## 2024-01-01 RX ADMIN — ACETAMINOPHEN 650 MG: 650 SUPPOSITORY RECTAL at 02:39

## 2024-01-01 RX ADMIN — NOREPINEPHRINE BITARTRATE 0.02 MCG/KG/MIN: 0.03 INJECTION, SOLUTION INTRAVENOUS at 02:23

## 2024-01-01 RX ADMIN — CEFEPIME 2000 MG: 2 INJECTION, POWDER, FOR SOLUTION INTRAVENOUS at 02:28

## 2024-01-02 ENCOUNTER — NURSING HOME (OUTPATIENT)
Dept: INTERNAL MEDICINE | Facility: CLINIC | Age: 84
End: 2024-01-02
Payer: MEDICARE

## 2024-01-02 DIAGNOSIS — F33.1 MAJOR DEPRESSIVE DISORDER, RECURRENT, MODERATE: ICD-10-CM

## 2024-01-02 DIAGNOSIS — E03.8 HYPOTHYROIDISM DUE TO HASHIMOTO'S THYROIDITIS: ICD-10-CM

## 2024-01-02 DIAGNOSIS — R53.1 GENERALIZED WEAKNESS: ICD-10-CM

## 2024-01-02 DIAGNOSIS — E06.3 HYPOTHYROIDISM DUE TO HASHIMOTO'S THYROIDITIS: ICD-10-CM

## 2024-01-02 DIAGNOSIS — Z86.73 HISTORY OF TIA (TRANSIENT ISCHEMIC ATTACK): ICD-10-CM

## 2024-01-02 DIAGNOSIS — K21.9 GASTROESOPHAGEAL REFLUX DISEASE WITHOUT ESOPHAGITIS: ICD-10-CM

## 2024-01-02 DIAGNOSIS — E78.2 MIXED HYPERLIPIDEMIA: ICD-10-CM

## 2024-01-02 DIAGNOSIS — I77.9 CAROTID ARTERY DISEASE, UNSPECIFIED LATERALITY, UNSPECIFIED TYPE: ICD-10-CM

## 2024-01-02 DIAGNOSIS — I63.02 CEREBROVASCULAR ACCIDENT (CVA) DUE TO THROMBOSIS OF BASILAR ARTERY: Primary | ICD-10-CM

## 2024-01-02 DIAGNOSIS — N39.0 ACUTE UTI (URINARY TRACT INFECTION): ICD-10-CM

## 2024-01-02 DIAGNOSIS — I10 PRIMARY HYPERTENSION: ICD-10-CM

## 2024-01-02 DIAGNOSIS — M06.9 RHEUMATOID ARTHRITIS INVOLVING MULTIPLE SITES, UNSPECIFIED WHETHER RHEUMATOID FACTOR PRESENT: ICD-10-CM

## 2024-01-02 DIAGNOSIS — Z72.0 TOBACCO ABUSE DISORDER: ICD-10-CM

## 2024-01-02 NOTE — PROGRESS NOTES
Nursing Home History and Physical Note      Ayaz Ferrari MD  [x]  [address], [address]  [place] [address]  Phone: [phone]  Fax: [phone]     PATIENT NAME: Patricia Donahue                                                                          YOB: 1940            DATE OF SERVICE: 01/02/2024  FACILITY:   [] [facility]   [x] [facility]  [] [facility]  [] Other      HISTORY OF PRESENT ILLNESS: Patient was seen today she is a very pleasant elderly female she is confused nonverbal, she had a stuffed animal pulled up next to her, she has a feeding tube in place, she does not appear to be in distress, she has a bandage on her left lower leg and foot area, she is not able to give me a history,    Her hospital course from it looks like the [facility], show she was admitted on December 10 discharged on December 21, 2023, diagnosis was stroke, she was seen by multiple consultants, her diagnosis was posterior circulation stroke with basilar artery thrombosis,    Her hospital course summary showed she came in with a history of TIAs going back to 2020 and a left internal carotid artery stenosis of greater than 80% with previous carotid endarterectomy in 2018 hypertension, and autoimmune arthritis possible rheumatoid on methotrexate and Plaquenil, she presented with upper and lower extremity weakness slurred speech, and CTA showed possible basilar artery tip thrombosis,, her hospital course showed her MRI does show a small right midbrain stroke right occipital likely consistent with hypertensive microhemorrhages heparin was started due to active thrombus she was then transition to Lovenox, she had multiple complications in the hospital including an elevated troponin level, she had altered mental status in the hospital she had a follow-up MRI December 16 that showed new strokes increase stroke burden from  previous exams, new strokes were in the bilateral corona radiata and right thalamus, she was put back on heparin, she then developed hematuria she had a CT of the abdomen for the hematuria which did not show any acute urinary problems but did find a possible adrenal mass, she was not able to swallow so PEG tube was placed Eliquis was started on , and the heparin drip was stopped, multiple testing was done in the hospital      PAST MEDICAL & SURGICAL HISTORY:   Past Medical History:   Diagnosis Date    Autoimmune disease     unknown name    Congestive heart failure (CHF)     Hypertension     Neck pain    Dementia  No past surgical history on file.       MEDICATIONS:  I have reviewed and reconciled the patients medication list in the patients chart at the [facility] today.      ALLERGIES:  No Known Allergies      SOCIAL HISTORY:  Social History     Socioeconomic History    Marital status:    Tobacco Use    Smoking status: Former     Types: Cigarettes     Quit date:      Years since quittin.0    Smokeless tobacco: Never   Vaping Use    Vaping Use: Never used   Substance and Sexual Activity    Alcohol use: Not Currently    Drug use: Never    Sexual activity: Defer       FAMILY HISTORY:  Family History   Problem Relation Age of Onset    Hypertension Mother     Hypertension Father     Cancer Brother          PHYSICAL EXAMINATION:   VITAL SIGNS: 120/78 sat 97% on room air, pulse 70 respiratory rate 18, temperature 97.1, weight 243.4 pounds    PHYSICAL EXAM: She is confused, she is in no distress, her eyes are open she does not verbalize, she has no palpable DP or PT pulse she has bandages on her left lower foot and ankle, cardiac exam reveals a regular rhythm her lungs are clear, her abdomen is soft nontender her PEG site is intact without erythema redness,      RECORDS REVIEW:   Orders Reviewed.  Labs Reviewed.      ICD-10-CM ICD-9-CM   1. Cerebrovascular accident (CVA) due to  thrombosis of basilar artery  I63.02 433.01   2. Generalized weakness  R53.1 780.79   3. History of TIA (transient ischemic attack)  Z86.73 V12.54   4. Acute UTI (urinary tract infection)  N39.0 599.0   5. Mixed hyperlipidemia  E78.2 272.2   6. Primary hypertension  I10 401.9   7. Hypothyroidism due to Hashimoto's thyroiditis  E03.8 244.8    E06.3 245.2   8. Carotid artery disease, unspecified laterality, unspecified type  I77.9 447.9   9. Tobacco abuse disorder  Z72.0 305.1   10. Gastroesophageal reflux disease without esophagitis  K21.9 530.81   11. Rheumatoid arthritis involving multiple sites, unspecified whether rheumatoid factor present  M06.9 714.0   12. Major depressive disorder, recurrent, moderate  F33.1 296.32       ASSESSMENT/PLAN    Diagnoses and all orders for this visit:    1. Cerebrovascular accident (CVA) due to thrombosis of basilar artery (Primary)    2. Generalized weakness    3. History of TIA (transient ischemic attack)    4. Acute UTI (urinary tract infection)    5. Mixed hyperlipidemia    6. Primary hypertension    7. Hypothyroidism due to Hashimoto's thyroiditis    8. Carotid artery disease, unspecified laterality, unspecified type    9. Tobacco abuse disorder    10. Gastroesophageal reflux disease without esophagitis    11. Rheumatoid arthritis involving multiple sites, unspecified whether rheumatoid factor present    12. Major depressive disorder, recurrent, moderate       Basilar artery thrombosis, with multiple areas of stroke in the brain, initially anticoagulated now on Eliquis milligrams twice a day,, aspirin 81 mg daily    Hypertension, continues lisinopril 15 milligrams daily, metoprolol 12.5 mg daily    Hyperlipidemia, continues rosuvastatin 5 mg half a tablet daily    Known carotid artery disease previous carotid endarterectomy 2018    Dysphagia, altered mental status, n.p.o. status, PEG tube placed December 19, 2023, tolerating tube feeds at time of discharge to our  facility    Tobacco abuse in the past was put on a nicotine patch    Drowsiness depression, was put on Provigil and fluoxetine for pseudobulbar affect,    Nonischemic cardiomyopathy by history in the hospital    Hypothyroidism continues levothyroxine 50 mcg daily    Hematuria UTI in the hospital resolved, CT did not show any acute findings, was on antibiotics for 5 days for UTI,     Adrenal mass may need CT with his protocols and outpatient pending her recovery    Plan is to start rehab, continue tube feedings on a continuous basis for now with speech therapy to evaluate, she is high risk for rehospitalization, high risk for GI bleeding with anticoagulation issues, overall prognosis is guarded, we will follow-up labs,    Ayaz Ferrari MD

## 2024-01-11 PROBLEM — I62.9 INTRACRANIAL BLEEDING: Status: ACTIVE | Noted: 2024-01-01

## 2024-01-11 NOTE — DISCHARGE SUMMARY
[facility] Medicine Services  DISCHARGE SUMMARY        Date of Admission: 2024    Date of Discharge:  2024    Length of stay:  LOS: 0 days     Presenting Problem:   Intracranial bleeding [I62.9]    Hospital Course     Active Diagnosis During Hospital Stay/Discharge Diagnoses/Course by Diagnoses:        Massive intracranial bleed   H/o CHF unknown EF  HTN  H/o stroke    Active Hospital Problems    Diagnosis  POA    **Intracranial bleeding [I62.9]  Yes      Resolved Hospital Problems   No resolved problems to display.         Hospital Course  Patient is a 83 y.o. female presented with resp distress to the ER from SNF was intubated. However imaging showed extensive intracranial hemorrhage. Family ultimately elected for comfort care and pt was subsequently terminally extubated and  24 at 0745.        Procedures Performed:none         Consults:   Consults       No orders found from 2023 to 2024.              Pertinent  and/or Most Recent Results       Pertinent Test Results:     Results from last 7 days   Lab Units 24  0113 01/09/24  0400 01/08/24  0400   WBC 10*3/mm3 14.52* 7.75 7.29   HEMOGLOBIN g/dL 11.2* 11.1* 11.9*   HEMATOCRIT % 36.8 35.3 38.1   PLATELETS 10*3/mm3 244 197 230     Results from last 7 days   Lab Units 24  0115 24  0113 01/09/24  0400 24  0400   SODIUM mmol/L  --  142 143 144   SODIUM, ARTERIAL mmol/L 142.5  --   --   --    POTASSIUM mmol/L  --  3.5 4.0 4.0   CHLORIDE mmol/L  --  105 109* 108*   CO2 mmol/L  --  23.8 24.2 23.4   BUN mg/dL  --  19 22 18   CREATININE mg/dL  --  0.84 0.68 0.63   CALCIUM mg/dL  --  8.7 9.1 9.2   BILIRUBIN mg/dL  --  0.2  --  0.2   ALK PHOS U/L  --  96  --  100   ALT (SGPT) U/L  --  34*  --  30   AST (SGOT) U/L  --  36*  --  26   GLUCOSE mg/dL  --  222* 126* 114*   GLUCOSE, ARTERIAL mg/dL 198*  --   --   --        Microbiology Results (last 10 days)       Procedure Component Value - Date/Time     COVID-19, FLU A/B, RSV PCR 1 HR TAT - Swab, Nasopharynx [[id number]]  (Normal) Collected: 01/11/24 0246    Lab Status: Final result Specimen: Swab from Nasopharynx Updated: 01/11/24 0344     COVID19 Not Detected     Influenza A PCR Not Detected     Influenza B PCR Not Detected     RSV, PCR Not Detected    Narrative:      Fact sheet for providers: https://www.fda.gov/media/352447/download    Fact sheet for patients: https://www.fda.gov/media/023092/download    Test performed by PCR.    Respiratory Culture - Sputum, ET Suction [[id number]] Collected: 01/11/24 0119    Lab Status: Preliminary result Specimen: Sputum from ET Suction Updated: 01/11/24 0209     Gram Stain Few (2+) Gram negative bacilli      Rare (1+) Gram positive cocci      Many (4+) Epithelial cells seen      Few (2+) WBCs seen                Imaging Results (All)       Procedure Component Value Units Date/Time    CT Head Without Contrast [[id number]] Collected: 01/11/24 0332     Updated: 01/11/24 0335    Narrative:      PROCEDURE: CT HEAD WO CONTRAST     COMPARISON: None.     INDICATIONS: Unresponsiveness.     PROTOCOL:   Standard CT imaging protocol performed      RADIATION:   Total DLP: 1,017.2 mGy*cm    MA and/or KV were/was adjusted to minimize radiation dose.      TECHNIQUE: After obtaining the patient's consent, 130 CT images were obtained without non-ionic   intravenous contrast material.      FINDINGS: The study is abnormal.  There is a very large acute intraparenchymal hematoma, measuring   about 7 cm in maximum diameter, centered in the right cerebral hemisphere (probably within the   right capsulo-lenticular region) with associated serous effusion and leftward subfalcine   herniation, estimated at 1.5 to 2 cm.  Extensive intraventricular hemorrhage is seen (including the   lateral and 3rd ventricles as well as the 4th ventricle).  There are Duret hemorrhages involving   the midbrain.  There is right-sided uncal-parahippocampal herniation.   Inferior tonsillar   herniation is seen.  There may be inferior central incisural herniation.  There is effacement of   the right lateral ventricle and entrapment of the left lateral ventricle.  Other areas of   intraparenchymal hemorrhage may be present (such as involving the left basal ganglia and the   bilateral thalami.  The most likely etiology for the large acute intraparenchymal hemorrhage on the   right would be an acute hypertensive hemorrhage although other causes of acute lobar hemorrhage   cannot be excluded.  There is diffuse bilateral cerebral sulcal effacement.  There is diffuse   effacement of the extra-cerebellar spaces.  No acute skull fracture is seen.  Mild-to-moderate   age-indeterminate congestive and/or inflammatory changes involve the paranasal sinuses.  he patient   is intubated.  Degenerative changes involve the partially imaged cervical spine.  There may be   minimal benign external auditory canal debris, especially on the right.  Otherwise, the middle ear   clefts are well aerated.       Impression:       The study is ABNORMAL.  Extensive acute intracranial hemorrhage is seen with   intracranial mass effect and herniation, as detailed above.          A preliminary report was phoned to the [facility] Emergency Department at about 0314 hours on 1/11/2024.    Dr. Suarez (ordering [facility] ED Physician) was not immediately available for phone discussion.    Therefore, a message was left with the [facility] Emergency Department  Staff (regarding the   study being abnormal).       Please note that portions of this note were completed with a voice recognition program.     DULCE PASTOR JR, MD         Electronically Signed and Approved By: DULCE PASTOR JR, MD on 1/11/2024 at 3:32                        XR Chest 1 View [[id number]] Collected: 01/11/24 0214     Updated: 01/11/24 0217    Narrative:      PROCEDURE: XR CHEST 1 VW     COMPARISON: 12/15/2021.     INDICATIONS: POST INTUBATION.  IMAGING  FOLLOW-UP.     FINDINGS: A single AP semi-upright portable chest radiograph was performed.  An endotracheal (ET)   tube is in place.  The distal tip of the endotracheal (ET) tube is projected about 4.5 cm above the   elham.  A nasogastric (NG) tube has been placed.  Its distal tip is likely projected below the   diaphragm and is not included in the field of view.  An implantable loop recorder (ILR) is   projected over the left lung base, similar to the prior exam.  New bilateral infiltrates are seen,   especially in the lung bases, and suggest new pulmonary edema with pulmonary vascular congestion.    These findings are thought less likely to represent infectious multifocal pneumonia.  Please   correlate clinically.  There is mild cardiomegaly, seen previously.  There are low lung volumes.    The thoracic aorta is atherosclerotic and ectatic.  There are old healed left-sided rib fractures.    Generalized osteopenia is seen.  There are degenerative changes of the imaged spine and the   bilateral shoulders.  External artifacts obscure detail.  No pneumothorax is suggested.  There is   mild gaseous distension of the stomach.  Minimal if any pleural effusion is appreciated.       Impression:         1. The endotracheal (ET) tube is in satisfactory position.    2. There is probably mild pulmonary edema with vascular congestion.    3. A nasogastric (NG) tube is in place, as well.    4. Please see above comments for further detail.              Please note that portions of this note were completed with a voice recognition program.     DULCE PASTOR JR, MD         Electronically Signed and Approved By: DULCE PASTOR JR, MD on 2024 at 2:14                                  Day of Discharge       Condition on Discharge:      Vital Signs  Temp:  [101.3 °F (38.5 °C)] 101.3 °F (38.5 °C)  Heart Rate:  [] 66  Resp:  [22] 22  BP: ()/(38-68) 144/68  FiO2 (%):  [45 %-60 %] 45 %    Physical Exam:  Physical  Exam        Discharge Disposition      Discharge Medications     Discharge Medications        Continue These Medications        Instructions Start Date   Aspirin 81 MG capsule   81 mg, Oral, Daily      calcium citrate-vitamin d 315-250 MG-UNIT tablet tablet  Commonly known as: CALCITRATE   1 tablet, Oral, 2 times daily      diphenhydrAMINE 25 mg capsule  Commonly known as: BENADRYL   25 mg, Oral, Nightly      docusate sodium 100 MG capsule   docusate sodium 100 mg oral capsule take 1 capsule (100 mg) by oral route once daily   Active      esomeprazole 40 MG capsule  Commonly known as: nexIUM   40 mg, Oral, 2 Times Daily      folic acid 1 MG tablet  Commonly known as: FOLVITE   No dose, route, or frequency recorded.      furosemide 40 MG tablet  Commonly known as: LASIX   1qd      HYDROXYCHLOROQUINE SULFATE PO   200 mg, Oral, 2 times daily      hydroxychloroquine 200 MG tablet  Commonly known as: PLAQUENIL   No dose, route, or frequency recorded.      ibuprofen 800 MG tablet  Commonly known as: ADVIL,MOTRIN   800 mg, Oral, 2 Times Daily      ketoconazole 2 % cream  Commonly known as: NIZORAL   No dose, route, or frequency recorded.      levothyroxine 100 MCG tablet  Commonly known as: SYNTHROID, LEVOTHROID   100 mcg, Oral, Every Early Morning      Synthroid 50 MCG tablet  Generic drug: levothyroxine   No dose, route, or frequency recorded.      losartan 25 MG tablet  Commonly known as: COZAAR   No dose, route, or frequency recorded.      methotrexate 2.5 MG tablet   No dose, route, or frequency recorded.      metoprolol succinate XL 25 MG 24 hr tablet  Commonly known as: TOPROL-XL   25 mg, 1/2 tablet daily      modafinil 100 MG tablet  Commonly known as: PROVIGIL   100 mg, Oral, Daily      multivitamin tablet tablet   1 tablet, Oral, Daily      NIFEdipine XL 90 MG 24 hr tablet  Commonly known as: PROCARDIA XL   90 mg, Oral, Daily      telmisartan 40 MG tablet  Commonly known as: MICARDIS   No dose, route, or  frequency recorded.      Turmeric 500 MG capsule   1 tablet, Oral, Daily               Discharge Diet:     Activity at Discharge:     Follow-up Appointments  No future appointments.      Test Results Pending at Discharge  Pending Labs       Order Current Status    Blood Culture - Blood, Arm, Left In process    Blood Culture - Blood, Arm, Right In process    Urine Culture - Urine, Indwelling Urethral Catheter In process    Respiratory Culture - Sputum, ET Suction Preliminary result             Risk for Readmission (LACE) Score: 5 (1/11/2024  6:00 AM)          Electronically signed by Kwame Malave DO, 01/11/24, 13:01 EST.      Note disclaimer: At [facility], we believe that sharing information builds trust and better relationships. You are receiving this note because you recently visited [facility]. It is possible you will see health information before a provider has talked with you about it. This kind of information can be easy to misunderstand. To help you fully understand what it means for your health, we urge you to discuss this note with your provider.

## 2024-01-11 NOTE — Clinical Note
Level of Care: Critical Care [6]   Diagnosis: Intracranial bleeding [086672]   Admitting Physician: ABDOULAYE SAMPSON [[id number]]   Bed Request Comments: Palliative Care

## 2024-01-11 NOTE — ED PROVIDER NOTES
Time: 2:54 AM EST  Date of encounter:  1/11/2024  Independent Historian/Clinical History and Information was obtained by:   EMS    History is limited by: Altered Mental Status    Chief Complaint: respiratory distress      History of Present Illness:  Patient is a 83 y.o. year old female who presents to the emergency department for evaluation of Respiratory distress.  Per report patient is currently at [facility] rehab facility.  Staff noted that she had difficulty breathing.  EMS was called when they arrived patient had difficulty breathing and became progressively worse.  Patient became unresponsive and EMS intubated patient.  Patient was brought to the emergency department for further evaluation.  History limited at this time.  Patient had a recent stroke with left-sided deficits.    HPI    Patient Care Team  Primary Care Provider: Ladarius Crockett MD    Past Medical History:     No Known Allergies  Past Medical History:   Diagnosis Date    Autoimmune disease     unknown name    Congestive heart failure (CHF)     Hypertension     Neck pain     Stroke      History reviewed. No pertinent surgical history.  Family History   Problem Relation Age of Onset    Hypertension Mother     Hypertension Father     Cancer Brother        Home Medications:  Prior to Admission medications    Medication Sig Start Date End Date Taking? Authorizing Provider   Aspirin 81 MG capsule Take 81 mg by mouth Daily.    Jb Grossman MD   calcium citrate-vitamin d (CALCITRATE) 315-250 MG-UNIT tablet tablet Take 1 tablet by mouth 2 (two) times a day.    Jb Grossman MD   diphenhydrAMINE (BENADRYL) 25 mg capsule Take 25 mg by mouth Every Night.    Jb Grossman MD   docusate sodium 100 MG capsule docusate sodium 100 mg oral capsule take 1 capsule (100 mg) by oral route once daily   Active    Jb Grossman MD   esomeprazole (nexIUM) 40 MG capsule Take 40 mg by mouth 2 (Two) Times a Day.    Jb Grossman MD    folic acid (FOLVITE) 1 MG tablet  22   Jb Grossman MD   furosemide (LASIX) 40 MG tablet 1qd 22   Jb Grossman MD   hydroxychloroquine (PLAQUENIL) 200 MG tablet  22   Jb Grossman MD   HYDROXYCHLOROQUINE SULFATE PO Take 200 mg by mouth 2 (two) times a day.    Jb Grossman MD   ibuprofen (ADVIL,MOTRIN) 800 MG tablet Take 800 mg by mouth 2 (Two) Times a Day.    Jb Grossman MD   ketoconazole (NIZORAL) 2 % cream  22   Jb Grossman MD   levothyroxine (SYNTHROID, LEVOTHROID) 100 MCG tablet Take 1 tablet by mouth Every Morning. 21   Babs La MD   losartan (COZAAR) 25 MG tablet  22   Jb Grossman MD   methotrexate 2.5 MG tablet  22   Jb Grossman MD   metoprolol succinate XL (TOPROL-XL) 25 MG 24 hr tablet 25 mg. 1/2 tablet daily 22   Jb Grossman MD   modafinil (PROVIGIL) 100 MG tablet Take 1 tablet by mouth Daily. 23   Ayaz Ferrari MD   multivitamin (THERAGRAN) tablet tablet Take 1 tablet by mouth Daily.    Jb Grossman MD   NIFEdipine XL (PROCARDIA XL) 90 MG 24 hr tablet Take 90 mg by mouth Daily.    Jb Grossman MD   Synthroid 50 MCG tablet  4/3/22   Jb Grossman MD   telmisartan (MICARDIS) 40 MG tablet  22   Jb Grossman MD   Turmeric 500 MG capsule Take 1 tablet by mouth Daily.    Jb Grossman MD        Social History:   Social History     Tobacco Use    Smoking status: Former     Types: Cigarettes     Quit date: 2020     Years since quittin.0    Smokeless tobacco: Never   Vaping Use    Vaping Use: Never used   Substance Use Topics    Alcohol use: Not Currently    Drug use: Never         Review of Systems:  Review of Systems   Unable to perform ROS: Patient unresponsive        Physical Exam:  /68   Pulse 66   Temp (!) 101.3 °F (38.5 °C) (Rectal)   Resp 22   Wt 69.4 kg (153 lb)   SpO2 100%   BMI 26.26 kg/m²      Physical Exam  Constitutional:       Appearance: Normal appearance.      Comments: unresponsive   HENT:      Head: Normocephalic and atraumatic.      Nose: Nose normal.      Mouth/Throat:      Mouth: Mucous membranes are moist.   Eyes:      Conjunctiva/sclera: Conjunctivae normal.      Comments: Pupils non reactive   Cardiovascular:      Rate and Rhythm: Normal rate and regular rhythm.      Pulses: Normal pulses.      Heart sounds: Normal heart sounds.   Pulmonary:      Effort: Pulmonary effort is normal.      Breath sounds: Rhonchi present.   Abdominal:      General: There is no distension.      Palpations: Abdomen is soft.      Tenderness: There is no abdominal tenderness.      Comments: G tube in place no surrounding erythema   Musculoskeletal:      Right lower leg: No edema.      Left lower leg: No edema.   Skin:     General: Skin is warm and dry.      Capillary Refill: Capillary refill takes less than 2 seconds.   Neurological:      Mental Status: Mental status is at baseline.      Comments: Pt is unresponsive she is intubated                  Procedures:  Procedures      Medical Decision Making:      Comorbidities that affect care:    CVA, Congestive Heart Failure, Hypertension    External Notes reviewed:    Previous Clinic Note: Patient was seen orthopedic surgery on 2/6/2023 for right knee pain.      The following orders were placed and all results were independently analyzed by me:  Orders Placed This Encounter   Procedures    Blood Culture - Blood,    Blood Culture - Blood,    Respiratory Culture - Sputum, ET Suction    COVID-19, FLU A/B, RSV PCR 1 HR TAT - Swab, Nasopharynx    Urine Culture - Urine,    XR Chest 1 View    CT Head Without Contrast    [place] Draw    Comprehensive Metabolic Panel    BNP    Single High Sensitivity Troponin T    ABG with Co-Ox and Electrolytes    CBC Auto Differential    Lactic Acid, Plasma    Urinalysis With Culture If Indicated - Urine, Clean Catch    Urinalysis,  Microscopic Only - Urine, Clean Catch    Undress & Gown    Continuous Pulse Oximetry    Vital Signs    Extubation    ECG 12 Lead ED Triage Standing Order; SOA    Initiate Observation Status    CBC & Differential    Green Top (Gel)    Lavender Top    Gold Top - SST    Light Blue Top       Medications Given in the Emergency Department:  Medications   sodium chloride 0.9 % bolus 1,000 mL (0 mL Intravenous Stopped 1/11/24 0212)   sodium chloride 0.9 % bolus 1,000 mL (0 mL Intravenous Stopped 1/11/24 0212)   cefepime (MAXIPIME) 2000 mg/100 mL 0.9% NS (mbp) (0 mg Intravenous Stopped 1/11/24 0300)   acetaminophen (TYLENOL) suppository 650 mg (650 mg Rectal Given 1/11/24 0239)        ED Course:    ED Course as of 01/12/24 0913   Thu Jan 11, 2024   0745 Time of death was called at 7:45 am [LD]      ED Course User Index  [LD] Jose Angel Suarez MD       Labs:    Lab Results (last 24 hours)       ** No results found for the last 24 hours. **             Imaging:    No Radiology Exams Resulted Within Past 24 Hours      Differential Diagnosis and Discussion:    Altered Mental Status: Based on the patient's signs and symptoms, differential diagnosis includes but is not limited to meningitis, stroke, sepsis, subarachnoid hemorrhage, intracranial bleeding, encephalitis, and metabolic encephalopathy.  Dyspnea: Differential diagnosis includes but is not limited to metabolic acidosis, neurological disorders, psychogenic, asthma, pneumothorax, upper airway obstruction, COPD, pneumonia, noncardiogenic pulmonary edema, interstitial lung disease, anemia, congestive heart failure, and pulmonary embolism    All labs were reviewed and interpreted by me.  All X-rays impressions were independently interpreted by me.  EKG was interpreted by me.  CT scan radiology impression was interpreted by me.    MDM  Number of Diagnoses or Management Options  Intracranial bleed  Diagnosis management comments: On arrival patient is nonresponsive.  She was  intubated by EMS.  Blood pressure was low.  She was started on IV fluids.  Blood pressure started to drop she was given additional IV fluids and subsequently started on Levophed.  She was started on broad-spectrum antibiotics.  Chest x-ray does not show acute findings.  Once patient was stabilized she was able to go to the CT to obtain a CT of her head that showed a massive intracranial hemorrhage.  I discussed case with neurosurgeon Dr. Rodriguez who reviewed imaging.  With the size of bleed this is not survivable and she is not a candidate for surgery.  I discussed this with family.  Patient will be made comfort care.  Patient was discussed with hospitalist.       Family decided to terminally extubate.  Patient passed away with family by her side in the emergency department.         Amount and/or Complexity of Data Reviewed  Clinical lab tests: reviewed  Tests in the radiology section of CPT®: reviewed  Review and summarize past medical records: yes  Independent visualization of images, tracings, or specimens: yes    Risk of Complications, Morbidity, and/or Mortality  Presenting problems: moderate  Management options: moderate         Critical Care Note: Total Critical Care time of 45 minutes. Total critical care time documented does not include time spent on separately billed procedures for services of nurses or physician assistants. I personally saw and examined the patient. I have reviewed all diagnostic interpretations and treatment plans as written. I was present for the key portions of any procedures performed and the inclusive time noted in any critical care statement. Critical care time includes patient management by me, time spent at the patients bedside,  time to review lab and imaging results, discussing patient care, documentation in the medical record, and time spent with family or caregiver.        Patient Care Considerations:          Consultants/Shared Management Plan:    Hospitalist: I have  discussed the case with Dr Sampson who agrees to accept the patient for admission.  Consultant: I have discussed the case with Dr Rodriguez who states with size of bleed this is not survivable and not surgical    Social Determinants of Health:    Patient is a nursing home/assisted living resident and has reliable access to care.      Disposition and Care Coordination:    Admit:   Through independent evaluation of the patient's history, physical, and imperical data, the patient meets criteria for observation/admission to the hospital.        Final diagnoses:   Intracranial bleed        ED Disposition       ED Disposition       Condition   --    Comment   Level of Care: Critical Care [6]  Diagnosis: Intracranial bleeding [006837]  Admitting Physician: ABDOULAYE SAMPSON [[id number]]  Bed Request Comments: Palliative Care                 This medical record created using voice recognition software.             Jose Angel Suarez MD  24 [id number]       Jose Angel Suarez MD  24 [id number]       Jose Angel Suarez MD  24 [id number]

## 2024-01-11 NOTE — H&P
[facility]   HISTORY AND PHYSICAL    Patient Name: Patricia Donahue  : 1940  MRN: [id number]  Primary Care Physician:  Ladarius Crockett MD  Date of admission: 2024    Subjective   Subjective     Chief Complaint: Respiratory distress    HPI:    Patricia Donahue is a 83 y.o. female with past medical history of hypertension, CHF, recent CVA, hypothyroidism, autoimmune disorder NOS, and GERD presented to the ED from nursing home after being found in respiratory distress.  Patient had recently been discharged to rehab facility after a sustaining a CVA with left-sided deficits.  Staff at nursing home/rehab noted the patient had difficulty breathing so they called EMS to bring her to the ED for evaluation.  In the ED patient continued to have difficulty breathing and progressively became worse and was subsequently intubated.  Patient was also found to be significantly hypotensive requiring pressor support with Levophed.  Labs show that she had a significant UTI with lactic acidosis and leukocytosis.  CT of the head showed extensive acute intracranial hemorrhage with mass effect and herniation.  Neurosurgery was consulted and recommended no intervention since it was inoperable and nonsurvivable.  Prognosis was discussed with family members and they agreed to comfort care and terminal extubation when all family members arrive.    Review of Systems   Patient intubated    Personal History     Past Medical History:   Diagnosis Date    Autoimmune disease     unknown name    Congestive heart failure (CHF)     Hypertension     Neck pain     Stroke        History reviewed. No pertinent surgical history.    Family History: family history includes Cancer in her brother; Hypertension in her father and mother. Otherwise pertinent FHx was reviewed and not pertinent to current issue.    Social History:  reports that she quit smoking about 4 years ago. Her smoking use included cigarettes. She has never used smokeless tobacco.  She reports that she does not currently use alcohol. She reports that she does not use drugs.    Home Medications:  Aspirin, Hydroxychloroquine Sulfate, NIFEdipine XL, Turmeric, calcium citrate-vitamin d, diphenhydrAMINE, docusate sodium, esomeprazole, folic acid, furosemide, hydroxychloroquine, ibuprofen, ketoconazole, levothyroxine, losartan, methotrexate, metoprolol succinate XL, modafinil, multivitamin, and telmisartan      Allergies:  No Known Allergies    Objective   Objective     Vitals:   Temp:  [101.3 °F (38.5 °C)] 101.3 °F (38.5 °C)  Heart Rate:  [] 69  Resp:  [22] 22  BP: ()/(38-55) 104/50  FiO2 (%):  [45 %-60 %] 45 %  Physical Exam    Constitutional: Patient intubated   Eyes: Pupils nonreactive   HENT: NCAT, mucous membranes moist   Neck: Supple, no thyromegaly, no lymphadenopathy, trachea midline   Respiratory: Clear to auscultation bilaterally, nonlabored respirations    Cardiovascular: RRR, no murmurs   Gastrointestinal: soft, nontender, nondistended   Musculoskeletal: No bilateral ankle edema   Psychiatric: Unable to evaluate   Neurologic: Patient unresponsive, intubated   Skin: No rashes     Result Review    Result Review:  I have personally reviewed the results from the time of this admission to 1/11/2024 04:52 EST and agree with these findings:  [x]  Laboratory list / accordion  []  Microbiology  [x]  Radiology  [x]  EKG/Telemetry   []  Cardiology/Vascular   []  Pathology  []  Old records  []  Other:  Most notable findings include: Massive intracranial bleed, lactic acidosis      Assessment & Plan   Assessment / Plan     Brief Patient Summary:  Patricia Donahue is a 83 y.o. female with past medical history of hypertension, CHF, recent CVA, hypothyroidism, autoimmune disorder NOS, and GERD presented to the ED from nursing home after being found in respiratory distress    Active Hospital Problems:  Active Hospital Problems    Diagnosis     **Intracranial bleeding      Plan:     Massive  intracranial bleed  -Admit to ICU/comfort care  -CT head reviewed  -Neurosurgery consulted.  No intervention since bleed is unsurvivable  -Family agreed to comfort measures  -Will start comfort measures   -Terminal extubation once family arrives  -We will follow along    DVT prophylaxis:  Mechanical DVT prophylaxis orders are present.    CODE STATUS: DNR, comfort care     Admission Status:  I believe this patient meets observation status.      Electronically signed by Roly Yao MD, 01/11/24, 4:52 AM EST.

## 2024-01-13 LAB
BACTERIA SPEC AEROBE CULT: ABNORMAL
BACTERIA SPEC RESP CULT: NORMAL
GRAM STN SPEC: NORMAL

## 2024-01-14 LAB
BACTERIA SPEC AEROBE CULT: ABNORMAL
GRAM STN SPEC: ABNORMAL
ISOLATED FROM: ABNORMAL

## 2024-01-16 LAB — BACTERIA SPEC AEROBE CULT: NORMAL
